# Patient Record
Sex: MALE | Race: ASIAN | Employment: FULL TIME | URBAN - METROPOLITAN AREA
[De-identification: names, ages, dates, MRNs, and addresses within clinical notes are randomized per-mention and may not be internally consistent; named-entity substitution may affect disease eponyms.]

---

## 2022-01-12 ENCOUNTER — LAB ENCOUNTER (OUTPATIENT)
Dept: LAB | Age: 22
End: 2022-01-12
Attending: INTERNAL MEDICINE
Payer: COMMERCIAL

## 2022-01-12 ENCOUNTER — TELEPHONE (OUTPATIENT)
Dept: INTERNAL MEDICINE CLINIC | Facility: CLINIC | Age: 22
End: 2022-01-12

## 2022-01-12 ENCOUNTER — OFFICE VISIT (OUTPATIENT)
Dept: INTERNAL MEDICINE CLINIC | Facility: CLINIC | Age: 22
End: 2022-01-12
Payer: COMMERCIAL

## 2022-01-12 VITALS
RESPIRATION RATE: 14 BRPM | SYSTOLIC BLOOD PRESSURE: 124 MMHG | OXYGEN SATURATION: 100 % | BODY MASS INDEX: 36.12 KG/M2 | TEMPERATURE: 98 F | HEIGHT: 69.29 IN | WEIGHT: 246.63 LBS | DIASTOLIC BLOOD PRESSURE: 80 MMHG | HEART RATE: 81 BPM

## 2022-01-12 DIAGNOSIS — Z00.00 PHYSICAL EXAM, ANNUAL: ICD-10-CM

## 2022-01-12 DIAGNOSIS — Z00.00 PHYSICAL EXAM, ANNUAL: Primary | ICD-10-CM

## 2022-01-12 DIAGNOSIS — Z23 NEED FOR VACCINATION: ICD-10-CM

## 2022-01-12 LAB
ALBUMIN SERPL-MCNC: 4.2 G/DL (ref 3.4–5)
ALBUMIN/GLOB SERPL: 1.1 {RATIO} (ref 1–2)
ALP LIVER SERPL-CCNC: 65 U/L
ALT SERPL-CCNC: 66 U/L
ANION GAP SERPL CALC-SCNC: 5 MMOL/L (ref 0–18)
AST SERPL-CCNC: 26 U/L (ref 15–37)
BASOPHILS # BLD AUTO: 0.02 X10(3) UL (ref 0–0.2)
BASOPHILS NFR BLD AUTO: 0.4 %
BILIRUB SERPL-MCNC: 0.5 MG/DL (ref 0.1–2)
BUN BLD-MCNC: 12 MG/DL (ref 7–18)
CALCIUM BLD-MCNC: 8.8 MG/DL (ref 8.5–10.1)
CHLORIDE SERPL-SCNC: 108 MMOL/L (ref 98–112)
CHOLEST SERPL-MCNC: 126 MG/DL (ref ?–200)
CO2 SERPL-SCNC: 27 MMOL/L (ref 21–32)
CREAT BLD-MCNC: 0.82 MG/DL
EOSINOPHIL # BLD AUTO: 0.11 X10(3) UL (ref 0–0.7)
EOSINOPHIL NFR BLD AUTO: 2.3 %
ERYTHROCYTE [DISTWIDTH] IN BLOOD BY AUTOMATED COUNT: 13.2 %
EST. AVERAGE GLUCOSE BLD GHB EST-MCNC: 120 MG/DL (ref 68–126)
FASTING PATIENT LIPID ANSWER: YES
FASTING STATUS PATIENT QL REPORTED: YES
GLOBULIN PLAS-MCNC: 3.7 G/DL (ref 2.8–4.4)
GLUCOSE BLD-MCNC: 93 MG/DL (ref 70–99)
HBA1C MFR BLD: 5.8 % (ref ?–5.7)
HCT VFR BLD AUTO: 49.7 %
HDLC SERPL-MCNC: 42 MG/DL (ref 40–59)
HGB BLD-MCNC: 16.3 G/DL
IMM GRANULOCYTES # BLD AUTO: 0.01 X10(3) UL (ref 0–1)
IMM GRANULOCYTES NFR BLD: 0.2 %
LDLC SERPL CALC-MCNC: 75 MG/DL (ref ?–100)
LYMPHOCYTES # BLD AUTO: 2.07 X10(3) UL (ref 1–4)
LYMPHOCYTES NFR BLD AUTO: 44.1 %
MCH RBC QN AUTO: 29.1 PG (ref 26–34)
MCHC RBC AUTO-ENTMCNC: 32.8 G/DL (ref 31–37)
MCV RBC AUTO: 88.8 FL
MONOCYTES # BLD AUTO: 0.49 X10(3) UL (ref 0.1–1)
MONOCYTES NFR BLD AUTO: 10.4 %
NEUTROPHILS # BLD AUTO: 1.99 X10 (3) UL (ref 1.5–7.7)
NEUTROPHILS # BLD AUTO: 1.99 X10(3) UL (ref 1.5–7.7)
NEUTROPHILS NFR BLD AUTO: 42.6 %
NONHDLC SERPL-MCNC: 84 MG/DL (ref ?–130)
OSMOLALITY SERPL CALC.SUM OF ELEC: 289 MOSM/KG (ref 275–295)
PLATELET # BLD AUTO: 249 10(3)UL (ref 150–450)
POTASSIUM SERPL-SCNC: 4.7 MMOL/L (ref 3.5–5.1)
PROT SERPL-MCNC: 7.9 G/DL (ref 6.4–8.2)
RBC # BLD AUTO: 5.6 X10(6)UL
SODIUM SERPL-SCNC: 140 MMOL/L (ref 136–145)
TRIGL SERPL-MCNC: 37 MG/DL (ref 30–149)
TSI SER-ACNC: 1.66 MIU/ML (ref 0.36–3.74)
VLDLC SERPL CALC-MCNC: 6 MG/DL (ref 0–30)
WBC # BLD AUTO: 4.7 X10(3) UL (ref 4–11)

## 2022-01-12 PROCEDURE — 90472 IMMUNIZATION ADMIN EACH ADD: CPT | Performed by: INTERNAL MEDICINE

## 2022-01-12 PROCEDURE — 80061 LIPID PANEL: CPT

## 2022-01-12 PROCEDURE — 83036 HEMOGLOBIN GLYCOSYLATED A1C: CPT

## 2022-01-12 PROCEDURE — 90686 IIV4 VACC NO PRSV 0.5 ML IM: CPT | Performed by: INTERNAL MEDICINE

## 2022-01-12 PROCEDURE — 36415 COLL VENOUS BLD VENIPUNCTURE: CPT

## 2022-01-12 PROCEDURE — 85025 COMPLETE CBC W/AUTO DIFF WBC: CPT

## 2022-01-12 PROCEDURE — 90715 TDAP VACCINE 7 YRS/> IM: CPT | Performed by: INTERNAL MEDICINE

## 2022-01-12 PROCEDURE — 84443 ASSAY THYROID STIM HORMONE: CPT

## 2022-01-12 PROCEDURE — 3079F DIAST BP 80-89 MM HG: CPT | Performed by: INTERNAL MEDICINE

## 2022-01-12 PROCEDURE — 99385 PREV VISIT NEW AGE 18-39: CPT | Performed by: INTERNAL MEDICINE

## 2022-01-12 PROCEDURE — 80053 COMPREHEN METABOLIC PANEL: CPT

## 2022-01-12 PROCEDURE — 3074F SYST BP LT 130 MM HG: CPT | Performed by: INTERNAL MEDICINE

## 2022-01-12 PROCEDURE — 90471 IMMUNIZATION ADMIN: CPT | Performed by: INTERNAL MEDICINE

## 2022-01-12 PROCEDURE — 3008F BODY MASS INDEX DOCD: CPT | Performed by: INTERNAL MEDICINE

## 2022-01-12 NOTE — PROGRESS NOTES
[facility]    CHIEF COMPLAINT: Patient presents with:  Establish Care: New Patient  Physical         HPI:   Wanda Ryan is a 24year old male who presents for a complete physical exam.      No current complaints. Exercises regularly. history  ALL/ASTHMA: denies hx of allergy or asthma  EXAM:   /80 (BP Location: Left arm, Patient Position: Sitting, Cuff Size: large)   Pulse 81   Temp 98 °F (36.7 °C) (Temporal)   Resp 14   Ht 5' 9.29\" (1.76 m)   Wt 246 lb 9.6 oz (111.9 kg)   SpO2

## 2022-01-12 NOTE — TELEPHONE ENCOUNTER
Hep B immu updated in the form  Copy made for chart  Immunization record printed and attached with form  Left detailed VM, Pt notified form is ready at

## 2022-01-12 NOTE — TELEPHONE ENCOUNTER
Spoke to pt  Pt does not need TB testing done  Form completed as able  To Dr. Devante Subramanian for review and signature

## 2022-01-12 NOTE — TELEPHONE ENCOUNTER
Pt dropped off a PE form that needs to be completed for Pts work. Placed in triage in basket   Please complete and let Pt know when he can pick it up.    Thank you

## 2022-01-13 ENCOUNTER — MED REC SCAN ONLY (OUTPATIENT)
Dept: INTERNAL MEDICINE CLINIC | Facility: CLINIC | Age: 22
End: 2022-01-13

## 2022-01-14 ENCOUNTER — MED REC SCAN ONLY (OUTPATIENT)
Dept: INTERNAL MEDICINE CLINIC | Facility: CLINIC | Age: 22
End: 2022-01-14

## 2022-01-17 DIAGNOSIS — R74.01 ELEVATED ALT MEASUREMENT: Primary | ICD-10-CM

## 2022-07-07 ENCOUNTER — TELEPHONE (OUTPATIENT)
Dept: INTERNAL MEDICINE CLINIC | Facility: CLINIC | Age: 22
End: 2022-07-07

## 2023-01-18 ENCOUNTER — APPOINTMENT (OUTPATIENT)
Dept: GENERAL RADIOLOGY | Age: 23
End: 2023-01-18
Attending: NURSE PRACTITIONER
Payer: COMMERCIAL

## 2023-01-18 ENCOUNTER — HOSPITAL ENCOUNTER (OUTPATIENT)
Age: 23
Discharge: HOME | End: 2023-01-18
Payer: COMMERCIAL

## 2023-01-18 VITALS
WEIGHT: 260 LBS | TEMPERATURE: 98 F | OXYGEN SATURATION: 97 % | SYSTOLIC BLOOD PRESSURE: 165 MMHG | BODY MASS INDEX: 38 KG/M2 | DIASTOLIC BLOOD PRESSURE: 87 MMHG | RESPIRATION RATE: 16 BRPM | HEART RATE: 92 BPM

## 2023-01-18 DIAGNOSIS — R22.41 LOCALIZED SWELLING OF RIGHT FOOT: Primary | ICD-10-CM

## 2023-01-18 PROCEDURE — 99203 OFFICE O/P NEW LOW 30 MIN: CPT

## 2023-01-18 PROCEDURE — 99213 OFFICE O/P EST LOW 20 MIN: CPT

## 2023-01-18 PROCEDURE — 73630 X-RAY EXAM OF FOOT: CPT | Performed by: NURSE PRACTITIONER

## 2023-01-18 NOTE — ED INITIAL ASSESSMENT (HPI)
Patient reports he went on a long walk about 1 month ago, and later noticed a bump underneath. Patient reports it is not painful.

## 2024-06-15 ENCOUNTER — HOSPITAL ENCOUNTER (OUTPATIENT)
Age: 24
Discharge: HOME | End: 2024-06-15

## 2024-06-15 VITALS
TEMPERATURE: 99 F | SYSTOLIC BLOOD PRESSURE: 143 MMHG | BODY MASS INDEX: 38.2 KG/M2 | RESPIRATION RATE: 18 BRPM | HEART RATE: 109 BPM | DIASTOLIC BLOOD PRESSURE: 91 MMHG | WEIGHT: 257.94 LBS | OXYGEN SATURATION: 100 % | HEIGHT: 69 IN

## 2024-06-15 DIAGNOSIS — B34.9 VIRAL SYNDROME: Primary | ICD-10-CM

## 2024-06-15 PROCEDURE — 99213 OFFICE O/P EST LOW 20 MIN: CPT

## 2024-06-15 RX ORDER — ONDANSETRON 4 MG/1
4 TABLET, ORALLY DISINTEGRATING ORAL EVERY 4 HOURS PRN
Qty: 10 TABLET | Refills: 0 | Status: SHIPPED | OUTPATIENT
Start: 2024-06-15 | End: 2024-06-22

## 2024-06-15 NOTE — ED PROVIDER NOTES
Patient Seen in: [facility]      History     Chief Complaint   Patient presents with    Nausea    Fever     Stated Complaint: fever, nasuea    Subjective:   HPI  23-year-old male presents to the immediate care with a fever bodyaches and chills since Tuesday.  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  States he has slight decrease in the past 2 days and is slightly nauseous after he ate today.  Patient has no other issues complaints or concerns.  The patient's medication list, past medical history and social history elements as listed in today's nurse's notes were reviewed and agreed (except as otherwise stated in the HPI).  The patient's family history reviewed and determined to be noncontributory to the presenting problem.      Objective:   History reviewed. No pertinent past medical history.           History reviewed. No pertinent surgical history.             Social History     Socioeconomic History    Marital status: Single   Occupational History    Occupation: [employer]   Tobacco Use    Smoking status: Former     Types: Cigarettes    Smokeless tobacco: Never    Tobacco comments:     occasional use for about a year.    Vaping Use    Vaping status: Never Used   Substance and Sexual Activity    Alcohol use: Yes     Comment: rarely    Drug use: Never    Sexual activity: Yes     Partners: Male   Other Topics Concern    Caffeine Concern No    Exercise Yes     Comment: running, home workouts    Seat Belt Yes    Special Diet Yes     Comment: Veggie    Stress Concern No    Weight Concern No              Review of Systems    Positive for stated complaint: fever, nasuea  Other systems are as noted in HPI.  Constitutional and vital signs reviewed.      All other systems reviewed and negative except as noted above.    Physical Exam     ED Triage Vitals [06/15/24 1001]   BP (!) 143/91   Pulse 109   Resp 18   Temp 98.6 °F (37 °C)   Temp src Oral   SpO2 100 %    O2 Device None (Room air)       Current Vitals:   Vital Signs  BP: (!) 143/91  Pulse: 109  Resp: 18  Temp: 98.6 °F (37 °C)  Temp src: Oral    Oxygen Therapy  SpO2: 100 %  O2 Device: None (Room air)            Physical Exam    GENERAL: The patient is well-developed well-nourished nontoxic, non-ill-appearing  HEENT: Normocephalic.  Atraumatic.  Extraocular motions are intact  NECK: Supple.  No meningitic signs are noted.   CHEST/LUNGS: Clear to auscultation.  There is no respiratory distress noted.  HEART/CARDIOVASCULAR: Regular.  There is no tachycardia.   SKIN: There is no rash.  NEURO: The patient is awake, alert, and oriented.  The patient is cooperative.    ED Course   Labs Reviewed - No data to display                   MDM   Patient present with signs and symptoms consistent with upper respiratory infection and consider possible life-threatening etiologies are presenting complaint including severe bacterial infection, meningitis, acute cardiopulmonary process etc.  And doubt given; history, exam,  Suspect likely viral etiology of upper respiratory infection.  Patient afebrile with normal vital signs and patient nontoxic appearing, well-hydrated in no apparent distress and no respiratory distress, this will discharge to follow-up with primary care physician in 2-3 days.  Supportive care instructions provided.  Patient to take over-the-counter and Tylenol and Motrin as needed for fever and pain.  Advised to return to the emergency room if any new or worsening symptoms including but not limited to; change in mental status.  Meningeal signs, abdominal pain, nausea vomiting, chest pain/shortness of breath, new rash, decreased urinary output or any other concerns.                                     Medical Decision Making      Disposition and Plan     Clinical Impression:  1. Viral syndrome         Disposition:  Discharge  6/15/2024 10:47 am    Follow-up:  Raiza Browne MD  [address] [facility]  [address] [place] [address]  [place] [address]  [phone]                Medications Prescribed:  Discharge Medication List as of 6/15/2024 10:48 AM        START taking these medications    Details   ondansetron 4 MG Oral Tablet Dispersible Take 1 tablet (4 mg total) by mouth every 4 (four) hours as needed for Nausea., Normal, Disp-10 tablet, R-0

## 2024-06-15 NOTE — ED INITIAL ASSESSMENT (HPI)
Fever- started Tuesday  temp 100 intermittent. Pt states he still feels sick . Took nyquil     Nausea- started tuesday

## 2024-06-15 NOTE — DISCHARGE INSTRUCTIONS
Follow-up with your primary care physician in one week if symptoms have not improved or symptoms are starting to get worse.  Increase fluids, keep well-hydrated.  Take Tylenol and Motrin for fever and pain.  Use the Zofran for nausea  Return to the emergency room for symptoms or concerns

## 2024-06-16 ENCOUNTER — APPOINTMENT (OUTPATIENT)
Dept: ULTRASOUND IMAGING | Facility: HOSPITAL | Age: 24
DRG: 442 | End: 2024-06-16
Attending: STUDENT IN AN ORGANIZED HEALTH CARE EDUCATION/TRAINING PROGRAM

## 2024-06-16 ENCOUNTER — HOSPITAL ENCOUNTER (INPATIENT)
Facility: HOSPITAL | Age: 24
LOS: 2 days | Discharge: HOME | DRG: 442 | End: 2024-06-18
Attending: STUDENT IN AN ORGANIZED HEALTH CARE EDUCATION/TRAINING PROGRAM | Admitting: INTERNAL MEDICINE

## 2024-06-16 DIAGNOSIS — B17.9 ACUTE HEPATITIS: ICD-10-CM

## 2024-06-16 DIAGNOSIS — R17 JAUNDICE: Primary | ICD-10-CM

## 2024-06-16 PROBLEM — R73.9 HYPERGLYCEMIA: Status: ACTIVE | Noted: 2024-06-16

## 2024-06-16 PROBLEM — R11.2 NAUSEA AND VOMITING: Status: ACTIVE | Noted: 2024-06-16

## 2024-06-16 LAB
ADENOVIRUS F 40/41 PCR: NEGATIVE
ALBUMIN SERPL-MCNC: 3.9 G/DL (ref 3.4–5)
ALBUMIN/GLOB SERPL: 0.8 {RATIO} (ref 1–2)
ALP LIVER SERPL-CCNC: 333 U/L
ALT SERPL-CCNC: 465 U/L
ANION GAP SERPL CALC-SCNC: 8 MMOL/L (ref 0–18)
APAP SERPL-MCNC: <2 UG/ML (ref 10–30)
APTT PPP: 25.9 SECONDS (ref 23–36)
AST SERPL-CCNC: 252 U/L (ref 15–37)
ASTROVIRUS PCR: NEGATIVE
BASOPHILS # BLD AUTO: 0.04 X10(3) UL (ref 0–0.2)
BASOPHILS NFR BLD AUTO: 0.8 %
BILIRUB SERPL-MCNC: 5.3 MG/DL (ref 0.1–2)
BUN BLD-MCNC: 8 MG/DL (ref 9–23)
C CAYETANENSIS DNA SPEC QL NAA+PROBE: NEGATIVE
CALCIUM BLD-MCNC: 8.8 MG/DL (ref 8.5–10.1)
CAMPY SP DNA.DIARRHEA STL QL NAA+PROBE: NEGATIVE
CHLORIDE SERPL-SCNC: 101 MMOL/L (ref 98–112)
CO2 SERPL-SCNC: 26 MMOL/L (ref 21–32)
CREAT BLD-MCNC: 0.97 MG/DL
CRYPTOSP DNA SPEC QL NAA+PROBE: NEGATIVE
DEPRECATED HBV CORE AB SER IA-ACNC: 3646.3 NG/ML
EAEC PAA PLAS AGGR+AATA ST NAA+NON-PRB: NEGATIVE
EC STX1+STX2 + H7 FLIC SPEC NAA+PROBE: NEGATIVE
EGFRCR SERPLBLD CKD-EPI 2021: 112 ML/MIN/1.73M2 (ref 60–?)
ENTAMOEBA HISTOLYTICA PCR: NEGATIVE
EOSINOPHIL # BLD AUTO: 0.13 X10(3) UL (ref 0–0.7)
EOSINOPHIL NFR BLD AUTO: 2.7 %
EPEC EAE GENE STL QL NAA+NON-PROBE: NEGATIVE
ERYTHROCYTE [DISTWIDTH] IN BLOOD BY AUTOMATED COUNT: 13.8 %
EST. AVERAGE GLUCOSE BLD GHB EST-MCNC: 131 MG/DL (ref 68–126)
ETEC LTA+ST1A+ST1B TOX ST NAA+NON-PROBE: NEGATIVE
FLUAV + FLUBV RNA SPEC NAA+PROBE: NEGATIVE
FLUAV + FLUBV RNA SPEC NAA+PROBE: NEGATIVE
GIARDIA LAMBLIA PCR: NEGATIVE
GLOBULIN PLAS-MCNC: 4.6 G/DL (ref 2.8–4.4)
GLUCOSE BLD-MCNC: 111 MG/DL (ref 70–99)
HAV IGM SER QL: NONREACTIVE
HBA1C MFR BLD: 6.2 % (ref ?–5.7)
HBV CORE AB SERPL QL IA: NONREACTIVE
HBV CORE IGM SER QL: NONREACTIVE
HBV SURFACE AG SERPL QL IA: NONREACTIVE
HCT VFR BLD AUTO: 51.6 %
HCV AB SERPL QL IA: NONREACTIVE
HETEROPH AB SER QL: NEGATIVE
HGB BLD-MCNC: 17.4 G/DL
IMM GRANULOCYTES # BLD AUTO: 0.01 X10(3) UL (ref 0–1)
IMM GRANULOCYTES NFR BLD: 0.2 %
INR BLD: 0.94 (ref 0.8–1.2)
IRON SATN MFR SERPL: 30 %
IRON SERPL-MCNC: 113 UG/DL
LIPASE SERPL-CCNC: 47 U/L (ref 13–75)
LYMPHOCYTES # BLD AUTO: 2.41 X10(3) UL (ref 1–4)
LYMPHOCYTES NFR BLD AUTO: 49.5 %
MCH RBC QN AUTO: 28 PG (ref 26–34)
MCHC RBC AUTO-ENTMCNC: 33.7 G/DL (ref 31–37)
MCV RBC AUTO: 83 FL
MONOCYTES # BLD AUTO: 0.34 X10(3) UL (ref 0.1–1)
MONOCYTES NFR BLD AUTO: 7 %
NEUTROPHILS # BLD AUTO: 1.94 X10 (3) UL (ref 1.5–7.7)
NEUTROPHILS # BLD AUTO: 1.94 X10(3) UL (ref 1.5–7.7)
NEUTROPHILS NFR BLD AUTO: 39.8 %
NOROVIRUS GI/GII PCR: POSITIVE
OSMOLALITY SERPL CALC.SUM OF ELEC: 279 MOSM/KG (ref 275–295)
P SHIGELLOIDES DNA STL QL NAA+PROBE: NEGATIVE
PLATELET # BLD AUTO: 175 10(3)UL (ref 150–450)
POTASSIUM SERPL-SCNC: 3.7 MMOL/L (ref 3.5–5.1)
PROT SERPL-MCNC: 8.5 G/DL (ref 6.4–8.2)
PROTHROMBIN TIME: 12.6 SECONDS (ref 11.6–14.8)
RBC # BLD AUTO: 6.22 X10(6)UL
ROTAVIRUS A PCR: NEGATIVE
RSV RNA SPEC NAA+PROBE: NEGATIVE
SALMONELLA DNA SPEC QL NAA+PROBE: NEGATIVE
SAPOVIRUS PCR: NEGATIVE
SARS-COV-2 RNA RESP QL NAA+PROBE: NOT DETECTED
SHIGELLA SP+EIEC IPAH ST NAA+NON-PROBE: NEGATIVE
SODIUM SERPL-SCNC: 135 MMOL/L (ref 136–145)
TIBC SERPL-MCNC: 380 UG/DL (ref 240–450)
TRANSFERRIN SERPL-MCNC: 255 MG/DL (ref 200–360)
V CHOLERAE DNA SPEC QL NAA+PROBE: NEGATIVE
VIBRIO DNA SPEC NAA+PROBE: NEGATIVE
WBC # BLD AUTO: 4.9 X10(3) UL (ref 4–11)
YERSINIA DNA SPEC NAA+PROBE: NEGATIVE

## 2024-06-16 PROCEDURE — 76700 US EXAM ABDOM COMPLETE: CPT | Performed by: STUDENT IN AN ORGANIZED HEALTH CARE EDUCATION/TRAINING PROGRAM

## 2024-06-16 PROCEDURE — 99223 1ST HOSP IP/OBS HIGH 75: CPT | Performed by: INTERNAL MEDICINE

## 2024-06-16 RX ORDER — MELATONIN
3 NIGHTLY PRN
Status: DISCONTINUED | OUTPATIENT
Start: 2024-06-16 | End: 2024-06-18

## 2024-06-16 RX ORDER — ENEMA 19; 7 G/133ML; G/133ML
1 ENEMA RECTAL ONCE AS NEEDED
Status: DISCONTINUED | OUTPATIENT
Start: 2024-06-16 | End: 2024-06-18

## 2024-06-16 RX ORDER — MULTIVITAMIN
1 TABLET ORAL DAILY
COMMUNITY

## 2024-06-16 RX ORDER — PROCHLORPERAZINE EDISYLATE 5 MG/ML
5 INJECTION INTRAMUSCULAR; INTRAVENOUS EVERY 8 HOURS PRN
Status: DISCONTINUED | OUTPATIENT
Start: 2024-06-16 | End: 2024-06-18

## 2024-06-16 RX ORDER — MORPHINE SULFATE 4 MG/ML
4 INJECTION, SOLUTION INTRAMUSCULAR; INTRAVENOUS EVERY 30 MIN PRN
Status: ACTIVE | OUTPATIENT
Start: 2024-06-16 | End: 2024-06-16

## 2024-06-16 RX ORDER — ENOXAPARIN SODIUM 100 MG/ML
40 INJECTION SUBCUTANEOUS DAILY
Status: DISCONTINUED | OUTPATIENT
Start: 2024-06-16 | End: 2024-06-18

## 2024-06-16 RX ORDER — ACETAMINOPHEN 500 MG
500 TABLET ORAL EVERY 4 HOURS PRN
Status: DISCONTINUED | OUTPATIENT
Start: 2024-06-16 | End: 2024-06-18

## 2024-06-16 RX ORDER — SODIUM CHLORIDE, SODIUM LACTATE, POTASSIUM CHLORIDE, CALCIUM CHLORIDE 600; 310; 30; 20 MG/100ML; MG/100ML; MG/100ML; MG/100ML
INJECTION, SOLUTION INTRAVENOUS CONTINUOUS
Status: DISCONTINUED | OUTPATIENT
Start: 2024-06-16 | End: 2024-06-18

## 2024-06-16 RX ORDER — POLYETHYLENE GLYCOL 3350 17 G/17G
17 POWDER, FOR SOLUTION ORAL DAILY PRN
Status: DISCONTINUED | OUTPATIENT
Start: 2024-06-16 | End: 2024-06-18

## 2024-06-16 RX ORDER — SENNOSIDES 8.6 MG
17.2 TABLET ORAL NIGHTLY PRN
Status: DISCONTINUED | OUTPATIENT
Start: 2024-06-16 | End: 2024-06-18

## 2024-06-16 RX ORDER — SODIUM CHLORIDE 9 MG/ML
INJECTION, SOLUTION INTRAVENOUS CONTINUOUS
Status: ACTIVE | OUTPATIENT
Start: 2024-06-16 | End: 2024-06-16

## 2024-06-16 RX ORDER — ONDANSETRON 2 MG/ML
4 INJECTION INTRAMUSCULAR; INTRAVENOUS EVERY 4 HOURS PRN
Status: DISCONTINUED | OUTPATIENT
Start: 2024-06-16 | End: 2024-06-16

## 2024-06-16 RX ORDER — BISACODYL 10 MG
10 SUPPOSITORY, RECTAL RECTAL
Status: DISCONTINUED | OUTPATIENT
Start: 2024-06-16 | End: 2024-06-18

## 2024-06-16 RX ORDER — ONDANSETRON 2 MG/ML
4 INJECTION INTRAMUSCULAR; INTRAVENOUS EVERY 6 HOURS PRN
Status: DISCONTINUED | OUTPATIENT
Start: 2024-06-16 | End: 2024-06-18

## 2024-06-16 NOTE — PROGRESS NOTES
NURSING ADMISSION NOTE      Patient admitted via Ambulatory  Oriented to room.  Safety precautions initiated.  Bed in low position.  Call light in reach.    Assumed care at 1400  A/O x 4  No tele  SBP slightly elevated, just monitoring for now per   Denies pain / nausea. No vomiting. Reports appetite returned today.   LR @ 100 ml/hr in R AC PIV  Continent  Slight jaundice noted  Up ad shayla. Ambulating in halls.   Regular diet  Non-cardiac EP  Reports BM today prior to arrival to ED, no diarrhea since Thursday. Awaiting stool sample. Reports dark colored urine.  GI on consult  All needs met. All questions answered. Will continue with plan of care.    1715: stool sample sent.

## 2024-06-16 NOTE — H&P
[facility]     Mauricio Joyner Patient Status:  Emergency    2000 MRN [id number]   [facility] EMERGENCY DEPARTMENT Attending Luana Carmona MD   Hosp Day # 0 PCP Raiza Browne MD     Chief Complaint: n/v/d, jaundice    Subjective:    History of Present Illness:     Mauricio Joyner is a 23 year old male with no significant pmhx who presents with complaint of several days of nausea, NBNB emesis, and multiple episodes of loose stools. He reports he developed malaise, fatigue, HA's and felt feverish (did not check his temperatures) 5 days ago. He had decreased appetite and poor PO intake the following days as well as continued to feel feverish/HA's. Had episode of loose/watery stools and nausea a few days ago which has since resolved. This morning, he felt he looked jaundiced when he looked in the mirror and he noted that his urine was much darker than normal and his stools were hank colored. He came to ED for further evaluation. His sister has history of autoimmune hepatitis which was diagnosed after she began using herbal supplements. He has another sister who has history of lupus. He started taking a supplement called \"bloom\" last week, but did not use it this week. No other herbal supplements. No recent sick contacts that he is aware of, or consumption of raw/undercooked meat. He rarely consumes EtOH.     History/Other:    Past Medical History:  History reviewed. No pertinent past medical history.  Past Surgical History:   History reviewed. No pertinent surgical history.   Family History:   Family History   Problem Relation Age of Onset    Diabetes Mother     High Blood Pressure Mother     High Blood Pressure Father     Heart Attack Father     Other (lupus) Sister      Social History:    reports that he has quit smoking. His smoking use included cigarettes. He has never used smokeless tobacco. He reports current alcohol use. He reports that he does not use  drugs.     Allergies:   Allergies   Allergen Reactions    Dander HIVES, ITCHING and OTHER (SEE COMMENTS)     Red Eyes      Dust OTHER (SEE COMMENTS)     Nasal Issues - Stuffy Nose, Sneezing         Medications:    No current facility-administered medications on file prior to encounter.     Current Outpatient Medications on File Prior to Encounter   Medication Sig Dispense Refill    ondansetron 4 MG Oral Tablet Dispersible Take 1 tablet (4 mg total) by mouth every 4 (four) hours as needed for Nausea. 10 tablet 0    Cyanocobalamin (VITAMIN B-12 OR) Take 1 tablet by mouth daily.      Ascorbic Acid (VITAMIN C OR) Take 1 tablet by mouth daily.         Review of Systems:   A comprehensive review of systems was completed.    Pertinent positives and negatives noted in the HPI.    Objective:   Physical Exam:    /69   Pulse 75   Temp 97.6 °F (36.4 °C)   Resp 19   Ht 5' 9\" (1.753 m)   Wt 250 lb (113.4 kg)   SpO2 100%   BMI 36.92 kg/m²   General: No acute distress, Alert, scleral icterus   Respiratory: No rhonchi, no wheezes  Cardiovascular: S1, S2. Regular rate and rhythm  Abdomen: Soft, Non-tender, non-distended, positive bowel sounds  Neuro: No new focal deficits  Extremities: No edema    Results:    Labs:      Labs Last 24 Hours:    Recent Labs   Lab 06/16/24  0939   RBC 6.22*   HGB 17.4   HCT 51.6   MCV 83.0   MCH 28.0   MCHC 33.7   RDW 13.8   NEPRELIM 1.94   WBC 4.9   .0       Recent Labs   Lab 06/16/24  0939   *   BUN 8*   CREATSERUM 0.97   EGFRCR 112   CA 8.8   ALB 3.9   *   K 3.7      CO2 26.0   ALKPHO 333*   *   *   BILT 5.3*   TP 8.5*       Lab Results   Component Value Date    INR 0.94 06/16/2024       No results for input(s): \"TROP\", \"TROPHS\", \"CK\" in the last 168 hours.    No results for input(s): \"TROP\", \"PBNP\" in the last 168 hours.    No results for input(s): \"PCT\" in the last 168 hours.    Imaging: Imaging data reviewed in Epic.    Assessment & Plan:       #Acute hepatitis, suspect viral etiology  #N/v/d  - GI stool PCR panel, acute hepatitis panel ordered  - INR pending  - abdominal ultrasound reviewed > diffuse fatty inffiltration  - supportive care, IVF, anti-emetics PRN  - trend LFT's  - GI consulted from ED    #Hyperglycemia  #Prediabetes  - HgbA1c ordered    #Elevated BP without prior diagnosis of HTN  - suspect stress mediated  - monitor BP's during admission        Plan of care discussed with pt, ED    Krystina Bailon,     Supplementary Documentation:     The 21st Century Cures Act makes medical notes like these available to patients in the interest of transparency. Please be advised this is a medical document. Medical documents are intended to carry relevant information, facts as evident, and the clinical opinion of the practitioner. The medical note is intended as peer to peer communication and may appear blunt or direct. It is written in medical language and may contain abbreviations or verbiage that are unfamiliar.

## 2024-06-16 NOTE — PLAN OF CARE
Problem: GASTROINTESTINAL - ADULT  Goal: Minimal or absence of nausea and vomiting  Description: INTERVENTIONS:  - Maintain adequate hydration with IV or PO as ordered and tolerated  - Nasogastric tube to low intermittent suction as ordered  - Evaluate effectiveness of ordered antiemetic medications  - Provide nonpharmacologic comfort measures as appropriate  - Advance diet as tolerated, if ordered  - Obtain nutritional consult as needed  - Evaluate fluid balance  Outcome: Progressing  Goal: Maintains or returns to baseline bowel function  Description: INTERVENTIONS:  - Assess bowel function  - Maintain adequate hydration with IV or PO as ordered and tolerated  - Evaluate effectiveness of GI medications  - Encourage mobilization and activity  - Obtain nutritional consult as needed  - Establish a toileting routine/schedule  - Consider collaborating with pharmacy to review patient's medication profile  Outcome: Progressing

## 2024-06-16 NOTE — ED PROVIDER NOTES
Patient Seen in: [facility] Emergency Department      History     Chief Complaint   Patient presents with    Jaundice     Stated Complaint: States that he has been sick for a while, and has been on a lot of medication. *    Subjective:   HPI    Patient is a 23-year-old previously healthy male presenting to emergency department for evaluation of a concern that his eyes seem yellow this morning.  Patient had been sick for about a week with headache, felt feverish, mild cough, diarrhea, difficulty sleeping and decreased appetite.  He recalls not eating for a few days as he was sleeping a lot of the time of the day.  He did seem to have a hard time falling back asleep when he would wake up at night.  He had been taking NyQuil and was prescribed Zofran for nausea by immediate care.  This morning he woke up feeling much better when he was in the restroom he became concerned about the discoloration of the white parts of his eyes.  He recalls that her sister who had jaundice secondary to hepatitis years ago had experienced yellowing of her eyes and decided to come to the ER for evaluation.  He denies any pain at this time, no nausea or vomiting currently, no fever, no other complaints.    Objective:   History reviewed. No pertinent past medical history.           History reviewed. No pertinent surgical history.             Social History     Socioeconomic History    Marital status: Single   Occupational History    Occupation: [employer]    Tobacco Use    Smoking status: Former     Types: Cigarettes    Smokeless tobacco: Never    Tobacco comments:     occasional use for about a year.    Vaping Use    Vaping status: Never Used   Substance and Sexual Activity    Alcohol use: Yes     Comment: rarely    Drug use: Never    Sexual activity: Yes     Partners: Male   Other Topics Concern    Caffeine Concern No    Exercise Yes     Comment: running, home workouts    Seat Belt Yes    Special Diet Yes      Comment: Veggie    Stress Concern No    Weight Concern No              Review of Systems    Positive for stated complaint: States that he has been sick for a while, and has been on a lot of medication. *  Other systems are as noted in HPI.  Constitutional and vital signs reviewed.      All other systems reviewed and negative except as noted above.    Physical Exam     ED Triage Vitals   BP 06/16/24 0934 (!) 172/101   Pulse 06/16/24 0934 93   Resp 06/16/24 0934 16   Temp 06/16/24 0934 97.6 °F (36.4 °C)   Temp src --    SpO2 06/16/24 0945 98 %   O2 Device --        Current:/69   Pulse 75   Temp 97.6 °F (36.4 °C)   Resp 19   Ht 175.3 cm (5' 9\")   Wt 113.4 kg   SpO2 100%   BMI 36.92 kg/m²         Physical Exam    Constitutional: No apparent distress  Eyes: Mild scleral icterus, no conjunctival injection, no eye drainage, pupils equal and reactive to light bilaterally  Heart: regular rate rhythm, no murmurs  Lungs: Clear to auscultation bilaterally  Abdomen: Soft, nontender, normal active bowel sounds  Skin: No rash  Neuro: Alert and oriented ×3          ED Course/ My interpretations:     Labs Reviewed   COMP METABOLIC PANEL (14) - Abnormal; Notable for the following components:       Result Value    Glucose 111 (*)     Sodium 135 (*)     BUN 8 (*)      (*)      (*)     Alkaline Phosphatase 333 (*)     Bilirubin, Total 5.3 (*)     Total Protein 8.5 (*)     Globulin  4.6 (*)     A/G Ratio 0.8 (*)     All other components within normal limits   CBC W/ DIFFERENTIAL - Abnormal; Notable for the following components:    RBC 6.22 (*)     All other components within normal limits   LIPASE - Normal   PROTHROMBIN TIME (PT) - Normal   PTT, ACTIVATED - Normal   SARS-COV-2/FLU A AND B/RSV BY PCR (GENEXPERT) - Normal    Narrative:     This test is intended for the qualitative detection and differentiation of SARS-CoV-2, influenza A, influenza B, and respiratory syncytial virus (RSV) viral RNA in  nasopharyngeal or nares swabs from individuals suspected of respiratory viral infection consistent with COVID-19 by their healthcare provider. Signs and symptoms of respiratory viral infection due to SARS-CoV-2, influenza, and RSV can be similar.    Test performed using the Xpert Xpress SARS-CoV-2/FLU/RSV (real time RT-PCR)  assay on the [employer] instrument, [employer], [place] [address].   This test is being used under the Food and Drug Administration's Emergency Use Authorization.    The authorized Fact Sheet for Healthcare Providers for this assay is available upon request from the laboratory.   CBC WITH DIFFERENTIAL WITH PLATELET    Narrative:     The following orders were created for panel order CBC With Differential With Platelet.  Procedure                               Abnormality         Status                     ---------                               -----------         ------                     CBC W/ DIFFERENTIAL[[id number]]          Abnormal            Final result                 Please view results for these tests on the individual orders.   SCAN SLIDE   HEPATITIS PANEL, ACUTE (4)   MONO QUAL, RFX TO EBV-VCA ON NEG   ACETAMINOPHEN (TYLENOL), S   HEMOGLOBIN A1C   RAINBOW DRAW LAVENDER   RAINBOW DRAW LIGHT GREEN   RAINBOW DRAW BLUE   RAINBOW DRAW GOLD   GI STOOL PANEL BY PCR         Medications given:   sodium chloride 0.9 % IV bolus 1,000 mL             MDM      Extensive differential was considered including acute jaundice, hepatitis, hemolysis, diverticulitis, cholecystitis, appendicitis, colitis, gastroenteritis, bowel obstruction, and other gi, infectious, vascular, malignant, rheumatologic and other pathology.   Symptoms were addressed with iv fluids.    No pain or nausea reported by the patient in the ED.  GI consultation was pursued.  Given severity of patient's symptoms and ED findings decision was made to admit the patient for further treatment and evaluation.    Patient remained  hemodynamically stable throughout their emergency department period of observation with no adverse events or symproms reported by the patient.  The case was discussed in detail with the admitting physician who agreed with the emergency department management and disposition of the patient and evaluated him in the emergency department taking over his care.     Admission disposition: 6/16/2024 12:44 PM               Disposition and Plan     Clinical Impression:  1. Jaundice    2. Acute hepatitis         Disposition:  Admit  6/16/2024 12:44 pm    Follow-up:  No follow-up provider specified.        Medications Prescribed:  Current Discharge Medication List                            Hospital Problems       Present on Admission  Date Reviewed: 1/18/2023            ICD-10-CM Noted POA    Jaundice R17 6/16/2024 Unknown           Documentation created with the aid of Dragon voice recognition software.  Although efforts were made to ensure the accuracy of the note, some inaccuracies may persist.

## 2024-06-16 NOTE — ED INITIAL ASSESSMENT (HPI)
Pt reports experiencing viral illnesses for about a week . Symptoms reported are headache\" feeling feverish\", mild cough, diarrhea, difficulty sleeping and decreased appetite. Today he is feeling better from illness but now states \"I am jaundiced\".

## 2024-06-16 NOTE — CONSULTS
[facility]                       Gastroenterology Consultation-[facility]    Mauricio Joyner Patient Status:  Emergency    2000 MRN [id number]   Location [facility] EMERGENCY DEPARTMENT Attending Luana Carmona MD   Hosp Day # 0 PCP Raiza Browne MD     Reason for consultation: Juandice/Elevated LFTs  HPI: Mr. Joyner is a 22 y/o with no significant past medical history who presents to the ED with concerns of juandice.  He reports viral symptoms that started approx 5 days ago with a headache and fatigue that progressed to him feeling feverish, chills, poor appetite, nausea without vomiting, abdominal pain, and 3 episodes of non-bloody diarrhea on Wednesday. He reports taking Nyquil x 3 days at night only with symptoms resolving shortly thereafter.  He then noticed his skin was turning yellow this morning. He reports consuming 1 can of bloom energy drink on Monday; denies any new medications, herbals, excessive Tylenol use, recent travels or known sick contacts. Initial labs note elevated transaminase levels; Alk Phos 333, , , Tbili 5.3, INR 0.94, no leukocytosis, hepatitis panel, Tylenol level and mono neg, Abdominal US notes diffusely increased echogenicity throughout the liver consistent with fatty infiltration of the liver or hepatitis, no focal mass or lesions, contracted GB with wall thickening measuring up to 6 cm, no definite gallstones, CBD measuring 3 mm with a negative Sahni's sign. He reports a familial hx of acute hepatitis (sister) and family hx of lupus; has no prior hospitalizations, abdominal surgeries or prior endoscopic evaluations.   PMHx: History reviewed. No pertinent past medical history.             PSHx: History reviewed. No pertinent surgical history.  Medications:    [COMPLETED] sodium chloride 0.9 % IV bolus 1,000 mL  1,000 mL Intravenous Once     Allergies:   Allergies   Allergen Reactions    Dander HIVES, ITCHING and OTHER (SEE  COMMENTS)     Red Eyes      Dust OTHER (SEE COMMENTS)     Nasal Issues - Stuffy Nose, Sneezing       Social HX:   Social History     Socioeconomic History    Marital status: Single   Occupational History    Occupation: [employer]    Tobacco Use    Smoking status: Former     Types: Cigarettes    Smokeless tobacco: Never    Tobacco comments:     occasional use for about a year.    Vaping Use    Vaping status: Never Used   Substance and Sexual Activity    Alcohol use: Yes     Comment: rarely    Drug use: Never    Sexual activity: Yes     Partners: Male   Other Topics Concern    Caffeine Concern No    Exercise Yes     Comment: running, home workouts    Seat Belt Yes    Special Diet Yes     Comment: Veggie    Stress Concern No    Weight Concern No      FamHx: The patient has no family history of colon cancer or other gastrointestinal malignancies;  No family history of ulcer disease, or inflammatory bowel disease  ROS:  In addition to the pertinent positives described above:            Infectious Disease:  No chronic infections or recent fevers prior to the acute illness            Cardiovascular: No history of CAD, prior MI, chest pain, or palpitations            Respiratory: No shortness of breath, asthma, copd, recurrent pneumonia            Hematologic: The patient reports no easy bruising, frequent gum bleeding or nose bleeding;  The patient has no history of known chronic anemia            Dermatologic: The patient reports no recent rashes or chronic skin disorders            Rheumatologic: The patient reports no history of chronic arthritis, myalgias, arthralgias            Genitourinary:  The patient reports no history of recurrent urinary tract infections, hematuria, dysuria, or nephrolithiasis           Psychiatric: The patient reports no history of depression, anxiety, suicidal ideation, or homicidal ideation           Oncologic: The patient reports no history of prior solid tumor  or hematologic malignancy           ENT: The patient reports no hoarseness of voice, hearing loss, sinus congestion, tinnitus           Neurologic: The patient reports no history of seizure, stroke, or frequent headaches  PE: /69   Pulse 75   Temp 97.6 °F (36.4 °C)   Resp 19   Ht 5' 9\" (1.753 m)   Wt 250 lb (113.4 kg)   SpO2 100%   BMI 36.92 kg/m²   Gen: AAO x 3, able to speak in complete sentences  HENT: EOMI, PERRL, oropharynx is clear with moist mucosal membranes  Eyes: Sclerae are icteric  Neck:  Supple without nuchal rigidity  CV: Regular rate and rhythm, with normal S1 and S2  Resp: Clear to auscultation bilaterally without wheezes; rubs, rhonchi, or rales  Abdomen: Soft, non-tender, non-distended with the presence of bowel sounds; No hepatosplenomegaly; no rebound or guarding; No ascites is clinically apparent; no tympany to percussion  Ext: No peripheral edema or cyanosis  Skin: Generalized jaundice, warm and dry  Psychiatric: Appropriate mood and congruent affect without obvious depression or anxiety  Labs:   Lab Results   Component Value Date    WBC 4.9 06/16/2024    HGB 17.4 06/16/2024    HCT 51.6 06/16/2024    .0 06/16/2024    CREATSERUM 0.97 06/16/2024    BUN 8 06/16/2024     06/16/2024    K 3.7 06/16/2024     06/16/2024    CO2 26.0 06/16/2024     06/16/2024    CA 8.8 06/16/2024    ALB 3.9 06/16/2024    ALKPHO 333 06/16/2024    BILT 5.3 06/16/2024     06/16/2024     06/16/2024    PTT 25.9 06/16/2024    INR 0.94 06/16/2024    PTP 12.6 06/16/2024    LIP 47 06/16/2024     Recent Labs   Lab 06/16/24  0939   *   BUN 8*   CREATSERUM 0.97   CA 8.8   *   K 3.7      CO2 26.0     Recent Labs   Lab 06/16/24  0939   RBC 6.22*   HGB 17.4   HCT 51.6   MCV 83.0   MCH 28.0   MCHC 33.7   RDW 13.8   NEPRELIM 1.94   WBC 4.9   .0       Recent Labs   Lab 06/16/24  0939   *   *                   Imaging:   Abdominal US:  Impression    CONCLUSION:       1.  Diffusely increased echogenicity of the liver can be seen with fatty infiltration of the liver or hepatitis.     2.  Contracted gallbladder without evidence of gallstones or biliary obstruction.     Impression: Mr. Joyner is a 24 y/o with no significant past medical history who presents to the ED with concerns of juandice, recent viral symptoms that started approx 5 days ago- now resolved.      Elevated Transaminase -  Alk Phos 333, , , Tbili 5.3, normal INR         Normal INR and Tylenol level, negative acute hepatitis panel, mono and viral panel; EBV pending    He is resting comfortably in bed with normal mentation in no acute distress --no asterixis on exam. Will manage conservatively at this time with the trending LFTs; additional lab work ordered to r/o infectious vs autoimmune hepatitis.         Recommendations:     Regular diet  DVT prophylaxis  CBC/CMP in am; trending LFTs  Iron studies, Ferritin  INR in am  CMV, AMA, ASMA, IgG4, A1at, ceruloplasmin, Hep B core, total AB  Pain management/antiemetics per primary service  Conservative management at this time while we await additional serology results    Thank you for the consultation, we will follow the patient with you.  Attending addendum Dr. Tulio Keyes to follow later today and provide formal, final recommendations at that time   SAMANTA Perez  1:12 PM  6/16/2024  [facility]  [phone]     GI attending addendum:    I have personally seen and examined this patient and agree with above nurse practitioner's assessment and recommendations.     Briefly, this is a 23-year-old man presenting with jaundice and elevated LFTs.  Patient began having viral symptoms a few days ago along with poor appetite nausea without vomiting and abdominal pain.  Patient was taking NyQuil for 3 days but no other chronic or severe use.  LFTs found to be elevated with , , , total bilirubin 5.3, INR  0.94.  Patient denies any confusion or somnolence.  No asterixis on exam.  Patient does endorse 1 can of a new energy drink but no other new medications or herbal supplements.  Ultrasound abdomen with contracted and thickened gallbladder and fatty infiltration.  No heavy alcohol use.    Assessment and Plan:  -Patient with acute elevation of LFTs of unknown etiology possibly infectious in nature versus biliary versus autoimmune versus Dili  -Will look to obtain chronic liver disease workup; EBV PCR pending  -Given thickened gallbladder wall, will obtain MR abdomen/MRCP  -Daily LFTs  -Will look to monitor closely for signs of encephalopathy; none currently    Alex Keyes MD, 06/16/24, 6:52 PM  [facility]  [phone]

## 2024-06-16 NOTE — ED QUICK NOTES
Orders for admission, patient is aware of plan and ready to go upstairs. Any questions, please call ED RN Caty at extension [phone].     Patient Covid vaccination status: Fully vaccinated     COVID Test Ordered in ED: SARS-CoV-2/Flu A and B/RSV by PCR (GeneXpert)    COVID Suspicion at Admission: N/A    Running Infusions:  None    Mental Status/LOC at time of transport: A&Ox4    Other pertinent information:   CIWA score: N/A   NIH score:  N/A

## 2024-06-17 ENCOUNTER — APPOINTMENT (OUTPATIENT)
Dept: MRI IMAGING | Facility: HOSPITAL | Age: 24
DRG: 442 | End: 2024-06-17
Attending: INTERNAL MEDICINE

## 2024-06-17 PROBLEM — A08.11 NOROVIRUS: Status: ACTIVE | Noted: 2024-06-17

## 2024-06-17 LAB
A1AT SERPL-MCNC: 181 MG/DL (ref 90–200)
ALBUMIN SERPL-MCNC: 3.4 G/DL (ref 3.4–5)
ALBUMIN/GLOB SERPL: 0.9 {RATIO} (ref 1–2)
ALP LIVER SERPL-CCNC: 285 U/L
ALT SERPL-CCNC: 405 U/L
ANION GAP SERPL CALC-SCNC: 4 MMOL/L (ref 0–18)
AST SERPL-CCNC: 194 U/L (ref 15–37)
BASOPHILS # BLD AUTO: 0.03 X10(3) UL (ref 0–0.2)
BASOPHILS NFR BLD AUTO: 0.6 %
BILIRUB SERPL-MCNC: 5.3 MG/DL (ref 0.1–2)
BUN BLD-MCNC: 8 MG/DL (ref 9–23)
CALCIUM BLD-MCNC: 8.6 MG/DL (ref 8.5–10.1)
CENTROMERE IGG SER-ACNC: 2 U/ML
CERULOPLASMIN SERPL-MCNC: 30.2 MG/DL (ref 20–60)
CHLORIDE SERPL-SCNC: 105 MMOL/L (ref 98–112)
CO2 SERPL-SCNC: 27 MMOL/L (ref 21–32)
CREAT BLD-MCNC: 0.82 MG/DL
DSDNA IGG SERPL IA-ACNC: 1.7 IU/ML
EBV NA IGG SER QL IA: POSITIVE
EBV VCA IGG SER QL IA: POSITIVE
EBV VCA IGM SER QL IA: NEGATIVE
EGFRCR SERPLBLD CKD-EPI 2021: 126 ML/MIN/1.73M2 (ref 60–?)
ENA AB SER QL IA: 26 UG/L
ENA AB SER QL IA: POSITIVE
ENA JO1 AB SER IA-ACNC: 0.4 U/ML
ENA RNP IGG SER IA-ACNC: 24.5 U/ML
ENA SCL70 IGG SER IA-ACNC: 1.5 U/ML
ENA SM IGG SER IA-ACNC: 0.9 U/ML
ENA SS-A IGG SER IA-ACNC: 120 U/ML
ENA SS-B IGG SER IA-ACNC: 54.4 U/ML
EOSINOPHIL # BLD AUTO: 0.13 X10(3) UL (ref 0–0.7)
EOSINOPHIL NFR BLD AUTO: 2.5 %
ERYTHROCYTE [DISTWIDTH] IN BLOOD BY AUTOMATED COUNT: 13.3 %
GLOBULIN PLAS-MCNC: 4 G/DL (ref 2.8–4.4)
GLUCOSE BLD-MCNC: 99 MG/DL (ref 70–99)
HCT VFR BLD AUTO: 45.7 %
HGB BLD-MCNC: 15.3 G/DL
HIV 1+2 AB+HIV1 P24 AG SERPL QL IA: NONREACTIVE
IMM GRANULOCYTES # BLD AUTO: 0.02 X10(3) UL (ref 0–1)
IMM GRANULOCYTES NFR BLD: 0.4 %
INR BLD: 0.93 (ref 0.8–1.2)
LYMPHOCYTES # BLD AUTO: 2.39 X10(3) UL (ref 1–4)
LYMPHOCYTES NFR BLD AUTO: 46.8 %
MCH RBC QN AUTO: 28.2 PG (ref 26–34)
MCHC RBC AUTO-ENTMCNC: 33.5 G/DL (ref 31–37)
MCV RBC AUTO: 84.3 FL
MONOCYTES # BLD AUTO: 0.4 X10(3) UL (ref 0.1–1)
MONOCYTES NFR BLD AUTO: 7.8 %
NEUTROPHILS # BLD AUTO: 2.14 X10 (3) UL (ref 1.5–7.7)
NEUTROPHILS # BLD AUTO: 2.14 X10(3) UL (ref 1.5–7.7)
NEUTROPHILS NFR BLD AUTO: 41.9 %
OSMOLALITY SERPL CALC.SUM OF ELEC: 280 MOSM/KG (ref 275–295)
PLATELET # BLD AUTO: 188 10(3)UL (ref 150–450)
POTASSIUM SERPL-SCNC: 4.2 MMOL/L (ref 3.5–5.1)
PROT SERPL-MCNC: 7.4 G/DL (ref 6.4–8.2)
PROTHROMBIN TIME: 12.5 SECONDS (ref 11.6–14.8)
RBC # BLD AUTO: 5.42 X10(6)UL
SODIUM SERPL-SCNC: 136 MMOL/L (ref 136–145)
U1 SNRNP IGG SER IA-ACNC: 50.8 U/ML
WBC # BLD AUTO: 5.1 X10(3) UL (ref 4–11)

## 2024-06-17 PROCEDURE — 99232 SBSQ HOSP IP/OBS MODERATE 35: CPT | Performed by: INTERNAL MEDICINE

## 2024-06-17 PROCEDURE — 74183 MRI ABD W/O CNTR FLWD CNTR: CPT | Performed by: INTERNAL MEDICINE

## 2024-06-17 PROCEDURE — 76376 3D RENDER W/INTRP POSTPROCES: CPT | Performed by: INTERNAL MEDICINE

## 2024-06-17 RX ORDER — GADOTERATE MEGLUMINE 376.9 MG/ML
20 INJECTION INTRAVENOUS
Status: COMPLETED | OUTPATIENT
Start: 2024-06-17 | End: 2024-06-17

## 2024-06-17 NOTE — PLAN OF CARE
Problem: GASTROINTESTINAL - ADULT  Goal: Minimal or absence of nausea and vomiting  Description: INTERVENTIONS:  - Maintain adequate hydration with IV or PO as ordered and tolerated  - Nasogastric tube to low intermittent suction as ordered  - Evaluate effectiveness of ordered antiemetic medications  - Provide nonpharmacologic comfort measures as appropriate  - Advance diet as tolerated, if ordered  - Obtain nutritional consult as needed  - Evaluate fluid balance  Outcome: Progressing     Problem: GASTROINTESTINAL - ADULT  Goal: Maintains or returns to baseline bowel function  Description: INTERVENTIONS:  - Assess bowel function  - Maintain adequate hydration with IV or PO as ordered and tolerated  - Evaluate effectiveness of GI medications  - Encourage mobilization and activity  - Obtain nutritional consult as needed  - Establish a toileting routine/schedule  - Consider collaborating with pharmacy to review patient's medication profile  Outcome: Progressing

## 2024-06-17 NOTE — PROGRESS NOTES
Patient A&Ox4.   VSS on RA.   No tele.   Patient denies pain at this time.   Patient denies any N/V at this time.   Tolerating regular diet.   NPO @0000  Ambulates well, up ad-shayla  LR @100 mL/hr    GI panel showing Norovirus.   Enteric/Contact Plus precautions in place.   Dr. Dempsey notified of results  Plan for  MRI abdomen and MRCP 6/17.     Updated patient on plan of care, pt verbalizes understanding.   Call light within reach.

## 2024-06-17 NOTE — PROGRESS NOTES
[facility]   part of [facility]     Hospitalist Progress Note     Mauricio Joyner Patient Status:  Inpatient    2000 MRN [id number]   [facility] 3NE-A Attending Krystina Bailon, DO   Hosp Day # 1 PCP Raiza Browne MD     Chief Complaint: n/v/d, jaundice     Subjective:     Patient feeling much better this morning, denies any abdominal pain/n/v/d/fevers/chills    Objective:    Review of Systems:   A comprehensive review of systems was completed; pertinent positive and negatives stated in subjective.    Vital signs:  Temp:  [97.5 °F (36.4 °C)-97.9 °F (36.6 °C)] 97.5 °F (36.4 °C)  Pulse:  [67-93] 67  Resp:  [16-26] 16  BP: (118-172)/() 137/82  SpO2:  [97 %-100 %] 99 %    Physical Exam:    General: No acute distress, mild scleral icterus   Respiratory: No wheezes, no rhonchi  Cardiovascular: S1, S2, regular rate and rhythm  Abdomen: Soft, Non-tender, non-distended, positive bowel sounds  Neuro: No new focal deficits.   Extremities: No edema    Diagnostic Data:    Labs:  Recent Labs   Lab 24  0939 24  0603   WBC 4.9 5.1   HGB 17.4 15.3   MCV 83.0 84.3   .0 188.0   INR 0.94 0.93       Recent Labs   Lab 24  0939 24  0603   * 99   BUN 8* 8*   CREATSERUM 0.97 0.82   CA 8.8 8.6   ALB 3.9 3.4   * 136   K 3.7 4.2    105   CO2 26.0 27.0   ALKPHO 333* 285*   * 194*   * 405*   BILT 5.3* 5.3*   TP 8.5* 7.4       Estimated Creatinine Clearance: 138.9 mL/min (based on SCr of 0.82 mg/dL).    No results for input(s): \"TROP\", \"TROPHS\", \"CK\" in the last 168 hours.    Recent Labs   Lab 24  0939 24  0603   PTP 12.6 12.5   INR 0.94 0.93                  Microbiology    No results found for this visit on 24.      Imaging: Reviewed in Epic.    Medications:    enoxaparin  40 mg Subcutaneous Daily       Assessment & Plan:      #Acute hepatitis, suspect viral etiology  #N/v/d d/t acute norovirus infection   - abdominal  ultrasound reviewed > diffuse fatty inffiltration  - GI stool PCR +for norovirus infection  - acute hepatitis panel negative  - autoimmune hepatitis w/u underway  - MRCP ordered  - supportive care, IVF, anti-emetics PRN  - trend LFT's > improving/stable this morning  - GI following     #Hyperglycemia  #Prediabetes  - HgbA1c 6.2%     #Elevated BP without prior diagnosis of HTN  - suspect stress mediated  - monitor BP's during admission      Krystina Bailon,     Supplementary Documentation:     Quality:  DVT Mechanical Prophylaxis:   SCDs,    DVT Pharmacologic Prophylaxis   Medication    enoxaparin (Lovenox) 40 MG/0.4ML SUBQ injection 40 mg                Code Status: Not on file  Cuello: No urinary catheter in place  Cuello Duration (in days):   Central line:    FABIANA:     Discharge is dependent on: clinical state, LFT's  At this point Mr. Joyner is expected to be discharge to: home    The 21st Century Cures Act makes medical notes like these available to patients in the interest of transparency. Please be advised this is a medical document. Medical documents are intended to carry relevant information, facts as evident, and the clinical opinion of the practitioner. The medical note is intended as peer to peer communication and may appear blunt or direct. It is written in medical language and may contain abbreviations or verbiage that are unfamiliar.

## 2024-06-17 NOTE — PLAN OF CARE
Assume care @ 0730  AO X4, Verbally responsive, RA  Denies pain. Pt verbalized feeling better.   Continent. Up ad shayla  IVFs LR@ 100ml/hr. NPO  MRCP result pending.   Fall and safety precautions in place      Problem: Patient/Family Goals  Goal: Patient/Family Long Term Goal  Description: Patient's Long Term Goal:   Interventions:  -   - See additional Care Plan goals for specific interventions  Outcome: Progressing  Goal: Patient/Family Short Term Goal  Description: Patient's Short Term Goal:     Interventions:   -  - See additional Care Plan goals for specific interventions  Outcome: Progressing     Problem: GASTROINTESTINAL - ADULT  Goal: Minimal or absence of nausea and vomiting  Description: INTERVENTIONS:  - Maintain adequate hydration with IV or PO as ordered and tolerated  - Nasogastric tube to low intermittent suction as ordered  - Evaluate effectiveness of ordered antiemetic medications  - Provide nonpharmacologic comfort measures as appropriate  - Advance diet as tolerated, if ordered  - Obtain nutritional consult as needed  - Evaluate fluid balance  Outcome: Progressing  Goal: Maintains or returns to baseline bowel function  Description: INTERVENTIONS:  - Assess bowel function  - Maintain adequate hydration with IV or PO as ordered and tolerated  - Evaluate effectiveness of GI medications  - Encourage mobilization and activity  - Obtain nutritional consult as needed  - Establish a toileting routine/schedule  - Consider collaborating with pharmacy to review patient's medication profile  Outcome: Progressing

## 2024-06-17 NOTE — PROGRESS NOTES
Gastroenterology Progress Note  Mauricio Joyner Patient Status:  Inpatient    2000 MRN [id number]   Location [facility] 3NE-A Attending Krystina Bailon, DO   Hosp Day # 1 PCP Raiza Browne MD     Chief Complaint: Elevated LFTs  S: Pt reports marked improvement--no nausea, vomiting, or abd pain. 1 loose non-bloody stools   No change in mentation.   O: /82 (BP Location: Left arm)   Pulse 67   Temp 97.5 °F (36.4 °C) (Oral)   Resp 16   Ht 5' 9\" (1.753 m)   Wt 258 lb (117 kg)   SpO2 99%   BMI 38.10 kg/m²   Gen: AAOx3  CV: RRR with normal S1 / S2  Resp: CTA bilaterally; No increase in respiratory effort   Abd: (+)BS, soft, non-tender, non-distended; no rebound or guarding  Ext: No edema or cyanosis  Skin: Warm and dry; mild jaundice   Neuro: no asterixis   Laboratory Data:     No results for input(s): \"PGLU\" in the last 168 hours.  Recent Labs   Lab 24  0939 24  0603   INR 0.94 0.93         Recent Labs   Lab 24  0939 24  0603   WBC 4.9 5.1   HGB 17.4 15.3   .0 188.0       Recent Labs   Lab 24  0939 24  0603   * 136   K 3.7 4.2    105   CO2 26.0 27.0   BUN 8* 8*   CREATSERUM 0.97 0.82       Recent Labs   Lab 24  0939 24  0603   * 405*   * 194*     Assessment: 23 yr-old male admitted yesterday with jaundice which started after nausea, poor PO intake. LFTs stable with mild improvement in transaminase levels. No bleeding or confusion noted. Synthetic function remains intact Hepatitis serology neg, EBV IGM neg. Will also check HIV + Hepatitis E and await MRCP results, autoimmune serology   Plan:   Await MRCP results   Await autoimmune serology which is in process  Check Hepatitis E + HIV (pt consented to testing) to blood in lab  OK to resume general diet from a GI perspective  Monitor for acute change in mentation--if confusion develops notify GI   DVT prophylaxis per  Hospitalist recommendations   CBC, CMP, INR in AM  Attending addendum (Dr Rea) to follow later today and provide formal, final recommendations at that time   SAMANTA Zhao  10:56 AM  6/17/2024  [facility]  [phone]

## 2024-06-18 VITALS
SYSTOLIC BLOOD PRESSURE: 107 MMHG | HEART RATE: 74 BPM | RESPIRATION RATE: 14 BRPM | HEIGHT: 69 IN | TEMPERATURE: 98 F | DIASTOLIC BLOOD PRESSURE: 45 MMHG | OXYGEN SATURATION: 99 % | WEIGHT: 258 LBS | BODY MASS INDEX: 38.21 KG/M2

## 2024-06-18 LAB
ACTIN SMOOTH MUSCLE AB: 5 UNITS
ALBUMIN SERPL-MCNC: 3.2 G/DL (ref 3.4–5)
ALBUMIN/GLOB SERPL: 0.8 {RATIO} (ref 1–2)
ALP LIVER SERPL-CCNC: 268 U/L
ALT SERPL-CCNC: 470 U/L
ANION GAP SERPL CALC-SCNC: 7 MMOL/L (ref 0–18)
AST SERPL-CCNC: 271 U/L (ref 15–37)
BILIRUB SERPL-MCNC: 6.1 MG/DL (ref 0.1–2)
BUN BLD-MCNC: 7 MG/DL (ref 9–23)
CALCIUM BLD-MCNC: 8.8 MG/DL (ref 8.5–10.1)
CHLORIDE SERPL-SCNC: 106 MMOL/L (ref 98–112)
CMV IGG AB: >10 U/ML
CMV IGM AB: <30 AU/ML
CO2 SERPL-SCNC: 25 MMOL/L (ref 21–32)
CREAT BLD-MCNC: 0.74 MG/DL
EGFRCR SERPLBLD CKD-EPI 2021: 130 ML/MIN/1.73M2 (ref 60–?)
ERYTHROCYTE [DISTWIDTH] IN BLOOD BY AUTOMATED COUNT: 13.4 %
GLOBULIN PLAS-MCNC: 3.9 G/DL (ref 2.8–4.4)
GLUCOSE BLD-MCNC: 102 MG/DL (ref 70–99)
HCT VFR BLD AUTO: 43.9 %
HGB BLD-MCNC: 14.6 G/DL
IGG SUBCLASS 1: 747 MG/DL
IGG SUBCLASS 2: 506 MG/DL
IGG SUBCLASS 3: 73 MG/DL
IGG SUBCLASS 4: 17 MG/DL
INR BLD: 0.99 (ref 0.8–1.2)
M2 MITOCHONDRIAL AB: <20 UNITS
MCH RBC QN AUTO: 27.6 PG (ref 26–34)
MCHC RBC AUTO-ENTMCNC: 33.3 G/DL (ref 31–37)
MCV RBC AUTO: 83 FL
OSMOLALITY SERPL CALC.SUM OF ELEC: 284 MOSM/KG (ref 275–295)
PLATELET # BLD AUTO: 231 10(3)UL (ref 150–450)
POTASSIUM SERPL-SCNC: 4.1 MMOL/L (ref 3.5–5.1)
PROT SERPL-MCNC: 7.1 G/DL (ref 6.4–8.2)
PROTHROMBIN TIME: 13.1 SECONDS (ref 11.6–14.8)
RBC # BLD AUTO: 5.29 X10(6)UL
SODIUM SERPL-SCNC: 138 MMOL/L (ref 136–145)
WBC # BLD AUTO: 4.2 X10(3) UL (ref 4–11)

## 2024-06-18 PROCEDURE — 99239 HOSP IP/OBS DSCHRG MGMT >30: CPT | Performed by: INTERNAL MEDICINE

## 2024-06-18 NOTE — DISCHARGE INSTRUCTIONS
YOU NEED FOLLOW-UP BLOOD TEST TO CHECK YOUR LIVER NUMBERS IN 2 DAYS. An order is placed for you in the [facility] system, please get this drawn

## 2024-06-18 NOTE — DISCHARGE SUMMARY
[facility]     Mauricio Joyner Patient Status:  Inpatient    2000 MRN [id number]   Location [facility] 3NE-A Attending No att. providers found   Hosp Day # 2 PCP Raiza Browne MD     Date of Admission: 2024  Date of Discharge:  2024   Discharge Disposition: Home or Self Care    Discharge Diagnosis:  #Acute hepatitis, suspect viral etiology  #N/v/d d/t acute norovirus infection   #Hyperglycemia  #Prediabetes; HgbA1c 6.2%  #Elevated BP without prior diagnosis of HTN    History of Present Illness: Mauricio Joyner is a 23 year old male with no significant pmhx who presents with complaint of several days of nausea, NBNB emesis, and multiple episodes of loose stools. He reports he developed malaise, fatigue, HA's and felt feverish (did not check his temperatures) 5 days ago. He had decreased appetite and poor PO intake the following days as well as continued to feel feverish/HA's. Had episode of loose/watery stools and nausea a few days ago which has since resolved. This morning, he felt he looked jaundiced when he looked in the mirror and he noted that his urine was much darker than normal and his stools were hank colored. He came to ED for further evaluation. His sister has history of autoimmune hepatitis which was diagnosed after she began using herbal supplements. He has another sister who has history of lupus. He started taking a supplement called \"bloom\" last week, but did not use it this week. No other herbal supplements. No recent sick contacts that he is aware of, or consumption of raw/undercooked meat. He rarely consumes EtOH.     Brief Synopsis: admitted with jaundice, transaminitis. GI was consulted. His MRCP was negative for biliary obstruction. He tested positive for norovirus infection. He was treated supportively. Acute hepatitis panel was negative. His autoimmune hepatitis panel was pending at time of discharge. He was discharged to home with plan to  repeat LFT's in 48 hours and follow up closely with GI in outpt setting. He was also encouraged to f/u with PCP after discharge.     Lace+ Score: 55  59-90 High Risk  29-58 Medium Risk  0-28   Low Risk  Patient was referred to the [facility].    TCM Follow-Up Recommendation:  LACE 29-58: Moderate Risk of readmission after discharge from the hospital.      Procedures during hospitalization:   none    Incidental or significant findings and recommendations (brief descriptions):  As above    Lab/Test results pending at Discharge:   none    Consultants:  GI    Discharge Medication List:     Discharge Medications        CONTINUE taking these medications        Instructions Prescription details   ondansetron 4 MG Tbdp  Commonly known as: Zofran-ODT      Take 1 tablet (4 mg total) by mouth every 4 (four) hours as needed for Nausea.   Stop taking on: June 22, 2024  Quantity: 10 tablet  Refills: 0     One-Daily Multi Vitamins Tabs      Take 1 tablet by mouth daily.   Refills: 0     VITAMIN B-12 OR      Take 1 tablet by mouth daily.   Refills: 0     VITAMIN C OR      Take 1 tablet by mouth daily.   Refills: 0              [place]    Follow-up appointment:   Raiza Browne MD  [address] [facility] [address]  [address]  [facility] [id number] [phone]    Follow up in 1 week(s)      Hugo Rea MD  [address]   [facility] [id number] [phone]    Follow up      Appointments for Next 30 Days 6/20/2024 - 7/20/2024        Date Arrival Time Visit Type Length Department Provider     6/21/2024 11:45 AM  [facility] PREPROCEDURE LAB [4157] 15 min [facility]    Patient Instructions:     This lab appointment is required prior to your scheduled procedure.  It is very important that you arrive on time for this lab appointment.     If you need to cancel, please call [phone].        Location Instructions:     Your appointment is scheduled at [facility].&nbsp; The address is [address] [place] [address].  To reach Outpatient Registration, park in the South Parking Garage and enter the double doors located on the ground floor. Proceed to the lobby past the Information Desk to Outpatient Registration.  [facility] Hours of Operation:  MONDAY-FRIDAY 6:00AM - 8:00PM SATURDAY 6:00AM - 3:00PM SUNDAY 8:AM-12:00PM  Masks are optional for all patients and visitors, unless otherwise indicated.               6/21/2024  1:00 PM  [facility] US BIOPSY LIVER [1560] 60 min [facility] Ultrasound E[facility] US RM 3     6/21/2024  1:00 PM  [facility] US BIOPSY LIVER [1560] 60 min [facility] CT  RADIOLOGIST SPEC CT US     6/21/2024  1:00 PM  [facility] US BIOPSY LIVER [1560] 60 min [facility] X-ray  RN RADIOLOGY     6/21/2024  2:00 PM  [facility] US BIOPSY LIVER [1560] 90 min [facility] Perioperative Service PERIOP RADIOLOGY RM    Patient Instructions:     If you have had prior imaging outside of an [facility], you must provide a copy for this appointment. If we do not receive a copy of your images or you do not bring them to your appointment, you will be rescheduled.    Prior to your arrival:    A nurse will be calling you to do a brief Health History Screen over the phone and give you any further instructions if needed.      If you suspect you may be pregnant, please consult with your physician prior to your exam.     Please make sure an adult is available to drive you home and it is recommended that an adult stays overnight with you after the procedure.     A complete history and physical is required within 30 days of your scheduled procedure.     Medication Instructions:    If you are taking an NSAID (Motrin, Advil or Ibuprofen), please call the ordering physician to see if that medication can be held for 5 days or at the time of scheduling.     If you are taking Aspirin, Plavix, Coumadin (Warfarin) or similar blood thinners,  please call the ordering physician to see if that medication can be held for 5 days prior to the procedure.     It is important for Radiology to be notified if you are not able to hold any of the above medications. For [facility] please call [phone]. For [facility] please call [phone]. The radiologist may need to contact the physician, as this may affect the exam/procedure.     All herbal medication should be held at the time of scheduling/screening.      Day of procedure:    After you register, you will be escorted directly to the radiology department.      Please arrive on time for your lab appointment (60 minutes prior to the scheduled procedure time).     Dietary Instructions:    Please have nothing to eat or drink for 6 hours prior to your procedure except for your usual medication with a small amount of water.       The estimated duration of your visit could take up to 6 hours, which includes recovery time after the procedure.      Location Instructions:     Your appointment will be at [facility] located at [address] [place] [address].&nbsp; To reach Outpatient Registration, park in the South Parking Garage and enter the double doors located on the ground floor.&nbsp; Proceed to the lobby past the Information Desk to Outpatient Registration. The phone number for this location is [phone].  [facility] reserves the right to restrict and prohibit the use of video, recording, and photography devices while on the premises. In order to protect the safety and privacy of our patients and staff, no photography, videotaping, or audio recording is allowed in any [facility] department without prior authorization.    Because of the nature of the Emergency Department/Immediate Care, please be advised of the possibility your appointment may be delayed.    Please bring your insurance card and photo ID. You will also need to bring your doctor's order unless your  physician's office submitted the order electronically or faxed the order. Without the order, your test may be delayed or postponed.  Children: Children under the age of 12 must have another adult caregiver with them.  Please do not bring your child/children without a caregiver.  Because of the highly sensitive equipment and privacy of all our patients, children will not be permitted in the exam rooms, unless otherwise noted and in accordance with departmental policy.   PATIENT RESPONSIBILITY ESTIMATE  - (Estimate) We will provide you with your estimated remaining deductible and coinsurance balance for your services at the time of check in.  - (Payment) Please be aware that you may be asked for payment at the time of service.  Masks are optional for all patients and visitors, unless otherwise indicated.               7/11/2024 12:40 PM  MYCHART EXAM [3314] 40 min [facility], YESSENIA Garner, Raiza Joseph MD    Patient Instructions:     Please arrive 15 minutes prior to your scheduled appointment. Please also bring your Insurance card, Photo ID, and your medication bottles or a list of your current medication.    If your condition improves and this appointment is no longer needed, please contact your physician office to cancel.    Please verify with your primary care provider if your insurance requires a referral.        Location Instructions:     Masks are optional for all patients and visitors, unless otherwise indicated.                      Vital signs:   /45 (BP Location: Left arm)   Pulse 74   Temp 98.4 °F (36.9 °C) (Oral)   Resp 14   Ht 5' 9\" (1.753 m)   Wt 258 lb (117 kg)   SpO2 99%   BMI 38.10 kg/m²       Physical Exam:    General: No acute distress   Lungs: clear to auscultation  Cardiovascular: S1, S2  Abdomen: Soft      -----------------------------------------------------------------------------------------------  PATIENT DISCHARGE INSTRUCTIONS: See electronic  chart    Krystina Bailon DO    Total time spent on discharge plannin minutes     The  [employer] Cures Act makes medical notes like these available to patients in the interest of transparency. Please be advised this is a medical document. Medical documents are intended to carry relevant information, facts as evident, and the clinical opinion of the practitioner. The medical note is intended as peer to peer communication and may appear blunt or direct. It is written in medical language and may contain abbreviations or verbiage that are unfamiliar.

## 2024-06-18 NOTE — PLAN OF CARE
Pt is A&O x4. VSS. RA. Pt denies pain/nausea/SOB/dizziness. IVF- LR @ 100mL/hr infusing to RAC PIV. Isolation precaution maintained. General diet. Up ad shayla. Pt showered this shift.   Plan for possible discharge tomorrow.        Problem: GASTROINTESTINAL - ADULT  Goal: Minimal or absence of nausea and vomiting  Description: INTERVENTIONS:  - Maintain adequate hydration with IV or PO as ordered and tolerated  - Nasogastric tube to low intermittent suction as ordered  - Evaluate effectiveness of ordered antiemetic medications  - Provide nonpharmacologic comfort measures as appropriate  - Advance diet as tolerated, if ordered  - Obtain nutritional consult as needed  - Evaluate fluid balance  Outcome: Progressing  Goal: Maintains or returns to baseline bowel function  Description: INTERVENTIONS:  - Assess bowel function  - Maintain adequate hydration with IV or PO as ordered and tolerated  - Evaluate effectiveness of GI medications  - Encourage mobilization and activity  - Obtain nutritional consult as needed  - Establish a toileting routine/schedule  - Consider collaborating with pharmacy to review patient's medication profile  Outcome: Progressing

## 2024-06-18 NOTE — PROGRESS NOTES
Assumed care around [address]. AOX4, VSS, afebrile. RA. Tele. IVF. IND in room. No acute events overnight. LFTs down trending. 2BM reported per chart 6/17. Will follow

## 2024-06-18 NOTE — PLAN OF CARE
Assumed care 0730.  Alert and oriented x4.  RA  No tele  BM today.   Denies pain  Discharge today  Continue to monitor pt    Problem: GASTROINTESTINAL - ADULT  Goal: Minimal or absence of nausea and vomiting  Description: INTERVENTIONS:  - Maintain adequate hydration with IV or PO as ordered and tolerated  - Nasogastric tube to low intermittent suction as ordered  - Evaluate effectiveness of ordered antiemetic medications  - Provide nonpharmacologic comfort measures as appropriate  - Advance diet as tolerated, if ordered  - Obtain nutritional consult as needed  - Evaluate fluid balance  Outcome: Adequate for Discharge  Goal: Maintains or returns to baseline bowel function  Description: INTERVENTIONS:  - Assess bowel function  - Maintain adequate hydration with IV or PO as ordered and tolerated  - Evaluate effectiveness of GI medications  - Encourage mobilization and activity  - Obtain nutritional consult as needed  - Establish a toileting routine/schedule  - Consider collaborating with pharmacy to review patient's medication profile  Outcome: Adequate for Discharge

## 2024-06-18 NOTE — PROGRESS NOTES
Gastroenterology Progress Note  Patient Name: Mauricio Joyner  Chief Complaint: Norovirus, jaundice  S: The patient reports no abdominal pain.  Less than 5 loose stools over the past 24 hours.  Tolerating a regular diet.   O: /45 (BP Location: Left arm)   Pulse 74   Temp 98.4 °F (36.9 °C) (Oral)   Resp 14   Ht 5' 9\" (1.753 m)   Wt 258 lb (117 kg)   SpO2 99%   BMI 38.10 kg/m²   Gen: AAOx3  Abd: (+)BS, soft, non-tender, non-distended; no rebound or guarding  Ext: No edema or cyanosis  Skin: Warm and dry  Laboratory Data:   Lab Results   Component Value Date    WBC 4.2 06/18/2024    HGB 14.6 06/18/2024    HCT 43.9 06/18/2024    .0 06/18/2024    CREATSERUM 0.74 06/18/2024    BUN 7 06/18/2024     06/18/2024    K 4.1 06/18/2024     06/18/2024    CO2 25.0 06/18/2024     06/18/2024    CA 8.8 06/18/2024    ALB 3.2 06/18/2024    ALKPHO 268 06/18/2024    BILT 6.1 06/18/2024     06/18/2024     06/18/2024     Recent Labs   Lab 06/16/24  0939 06/17/24  0603 06/18/24  0723   * 99 102*   BUN 8* 8* 7*   CREATSERUM 0.97 0.82 0.74   CA 8.8 8.6 8.8   * 136 138   K 3.7 4.2 4.1    105 106   CO2 26.0 27.0 25.0     Recent Labs   Lab 06/17/24  0603 06/18/24  0723   RBC 5.42 5.29   HGB 15.3 14.6   HCT 45.7 43.9   MCV 84.3 83.0   MCH 28.2 27.6   MCHC 33.5 33.3   RDW 13.3 13.4   NEPRELIM 2.14  --    WBC 5.1 4.2   .0 231.0       Recent Labs   Lab 06/16/24  0939 06/17/24  0603 06/18/24  0723   * 405* 470*   * 194* 271*     Procedure       Date/Time   MRI ABDOMEN AND MRCP W/3D (W+W0)(CPT=74183/00521) [[id number]] Collected: 06/17/24 1239   Order Status: Completed Updated: 06/17/24 1240   Narrative:     PROCEDURE:  MRI ABDOMEN&MRCP W/3D (W+W0)(CPT=74183/80559)     COMPARISON:  US TANYA, US ABDOMEN COMPLETE (CPT=76700), 6/16/2024, 11:37 AM.     INDICATIONS:  elevated lfts, thickened GB     TECHNIQUE:  Multiplanar single shot fast spin echo, chemical  shift imaging, breath hold T2 weighted images and 2-D fiesta sequences were acquired. Fluid sensitive imaging with MRCP reconstruction was also performed including 3D multi-projection imaging  (non independent workstation).  Both projection and source MRCP images are evaluated.  Subsequent post contrast imaging was performed after intravenous administration of paramagnetic contrast agent.     3-D RENDERING:  Three dimensional image processing was completed using a separate workstation under concurrent supervision. Images were archived.     PATIENT STATED HISTORY:(As transcribed by Technologist)  Elevated lfts and thickened GB. Patient states having abdominal pain      CONTRAST USED:  20mL of Dotarem     FINDINGS:    LIVER:  There is diffuse dropout of signal on out of phase imaging consistent with fatty infiltration of the liver.  There is no focal mass lesion.  Post-contrast imaging of the liver is grossly unremarkable.  BILIARY:  The gallbladder is contracted.  There is no gallbladder wall inflammation.  The common bile duct is normal.  No intrahepatic ductal dilatation.  PANCREAS:  No lesion, fluid collection, ductal dilatation, or atrophy.    SPLEEN:  No enlargement or focal lesion.    KIDNEYS:  No mass or obstruction.    ADRENALS:  No mass or enlargement.    AORTA/VASCULAR:  No aneurysm or dissection.    RETROPERITONEUM:  No mass or adenopathy.    BOWEL/MESENTERY:  No visible mass, obstruction, or bowel wall thickening.    ABDOMINAL WALL:  No mass or hernia.    BONES:  No bony lesion or fracture.    LUNG BASES:  No visible pleural disease.  Lung bases not well assessed with MRI.                     Impression:     CONCLUSION:       1. Fatty infiltration of the liver.       2. Gallbladder is contracted.  There is no evidence of biliary obstruction.       LOCATION:  [facility]           Dictated by (CST): Napoleon Hampton MD on 6/17/2024 at 12:15 PM      Finalized by (CST): Napoleon Hampton MD on 6/17/2024 at 12:39 PM          Assessment: This is a patient who presented with jaundice, diarrhea, and now diagnosed with Norovirus.  His liver chemistries have been fluctuating, but his Tbili has risen.  No evidence of coagulopathy or encephalopathy.  MRCP indicates no biliary obstruction.   Diarrhea is improving in frequency.  Tolerating diet.   Plan:   1) Plan discharge home today  2) Repeat LFT's in 48 hours  3) If continuing to rise, would do liver biopsy  4) Will send HSV pcr.  Also awaiting other labs ordered and in process.

## 2024-06-19 ENCOUNTER — PATIENT OUTREACH (OUTPATIENT)
Dept: CASE MANAGEMENT | Age: 24
End: 2024-06-19

## 2024-06-19 DIAGNOSIS — Z02.9 ENCOUNTERS FOR ADMINISTRATIVE PURPOSE: ICD-10-CM

## 2024-06-19 DIAGNOSIS — R17 JAUNDICE: Primary | ICD-10-CM

## 2024-06-19 NOTE — PROGRESS NOTES
NCM attempted to contact pt for TCM, no answer, call continued to ring, no VM option, unable to leave VM. NCM will try calling back later.

## 2024-06-19 NOTE — PROGRESS NOTES
Initial Post Discharge Follow Up   Discharge Date: 6/18/24  Contact Date: 6/19/2024    Consent Verification:  Assessment Completed With: Patient  HIPAA Verified?  Yes    Discharge Dx:   Jaundice        General:   How have you been since your discharge from the hospital?  I'm feeling really great. Stool is still runny. No pain, no nausea I just ate lunch and am keeping it in.   Do you have any pain since discharge?  No    When you were leaving the hospital were your discharge instructions reviewed with you? Yes  How well were your discharge instructions explained to you?   On a scale of 1-5   1- Very Poor and 5- Very well   Very Well  Do you have any questions about your discharge instructions?  No  Before leaving the hospital was your diagnoses explained to you? Yes  Do you have any questions about your diagnoses? No  Are you able to perform normal daily activities of living as you have prior to your hospital stay (dressing, bathing, ambulating to the bathroom, etc)? yes  (NCM) Was patient given a different diet per AVS? no      Medications:   Current Outpatient Medications   Medication Sig Dispense Refill    Multiple Vitamin (ONE-DAILY MULTI VITAMINS) Oral Tab Take 1 tablet by mouth daily.      ondansetron 4 MG Oral Tablet Dispersible Take 1 tablet (4 mg total) by mouth every 4 (four) hours as needed for Nausea. 10 tablet 0    Cyanocobalamin (VITAMIN B-12 OR) Take 1 tablet by mouth daily. (Patient not taking: Reported on 6/16/2024)      Ascorbic Acid (VITAMIN C OR) Take 1 tablet by mouth daily. (Patient not taking: Reported on 6/16/2024)       Were there any changes to your current medication(s) noted on the AVS? No  Let's go over your medications together to make sure we are not missing anything. Medications Reviewed  Are there any reasons that keep you from taking your medication as prescribed? No  Are you having any concerns with constipation? No      Discharge medications reviewed/discussed/and reconciled  against outpatient medications with patient.  Any changes or updates to medications sent to PCP.  Patient Acknowledged     Referrals/orders at D/C:  Referrals/orders placed at D/C? no    DME ordered at D/C? No      Discharge orders, AVS reviewed and discussed with patient. Any changes or updates to orders sent to PCP.  Patient Acknowledged      SDOH:   Transportation Needs: No Transportation Needs (6/16/2024)    Transportation Needs     Lack of Transportation: No     Car Seat: Not on file     Financial Resource Strain: Low Risk  (6/19/2024)    Financial Resource Strain     Difficulty of Paying Living Expenses: Not very hard     Med Affordability: No       Follow up appointments:      Your appointments       Date & Time Appointment Department (Nora)    Jul 11, 2024 12:40 PM CDT Office Visit with Raiza Browne MD [facility])    Please arrive 15 minutes prior to your scheduled appointment. Please also bring your Insurance card, Photo ID, and your medication bottles or a list of your current medication.    If your condition improves and this appointment is no longer needed, please contact your physician office to cancel.    Please verify with your primary care provider if your insurance requires a referral.              [facility] [address]  [address] [facility] [address] [id number] [phone]            TCC  Was TCC ordered: Yes  Was TCC scheduled: No, Explain-prefers to see PCP      PCP (If no TCC appointment)  Does patient already have a PCP appointment scheduled? No  NCM Scheduled PCP office TCM appointment with patient      Specialist    Does the patient have any other follow up appointment(s) needing to be scheduled? Yes  If yes: [facility] reviewed upcoming specialist appointment with patient: Yes  Does the patient need assistance scheduling appointment(s):  No, pt states that he needs to see PCP before he can schedule with GI. Appt with PCP is on 6/21/24.     Is there any reason as to why you cannot make your appointment(s)?  No     Needs post D/C:   Now that you are home, are there any needs or concerns you need addressed before your next visit with your PCP?  (DME, meds, questions, etc.): No    Interventions by NCM:   NCM reviewed discharge instructions and when to seek medical attention with the patient. He states that he is feeling pretty good. He still has some diarrhea but that is unchanged. He denies having any further pain. He states that he ate today and was able to keep it down. He denied having any fever, n/v, sob, pain, lightheadedness, HA or any new or worsening symptoms. Med review completed. Pt confirms that he will get blood work completed tomorrow. He denied having any questions or concerns at this time.       [facility] referral placed:    No    BOOK BY DATE: 7/2/24

## 2024-06-20 ENCOUNTER — LAB ENCOUNTER (OUTPATIENT)
Dept: LAB | Age: 24
End: 2024-06-20
Attending: NURSE PRACTITIONER

## 2024-06-20 DIAGNOSIS — R17 JAUNDICE: ICD-10-CM

## 2024-06-20 DIAGNOSIS — B17.9 ACUTE HEPATITIS: ICD-10-CM

## 2024-06-20 LAB
ALBUMIN SERPL-MCNC: 3.7 G/DL (ref 3.4–5)
ALBUMIN/GLOB SERPL: 0.9 {RATIO} (ref 1–2)
ALP LIVER SERPL-CCNC: 299 U/L
ALT SERPL-CCNC: 574 U/L
ANION GAP SERPL CALC-SCNC: 5 MMOL/L (ref 0–18)
AST SERPL-CCNC: 204 U/L (ref 15–37)
BILIRUB SERPL-MCNC: 7.7 MG/DL (ref 0.1–2)
BUN BLD-MCNC: 10 MG/DL (ref 9–23)
CALCIUM BLD-MCNC: 9 MG/DL (ref 8.5–10.1)
CHLORIDE SERPL-SCNC: 106 MMOL/L (ref 98–112)
CO2 SERPL-SCNC: 24 MMOL/L (ref 21–32)
CREAT BLD-MCNC: 0.79 MG/DL
EGFRCR SERPLBLD CKD-EPI 2021: 127 ML/MIN/1.73M2 (ref 60–?)
GLOBULIN PLAS-MCNC: 4.2 G/DL (ref 2.8–4.4)
GLUCOSE BLD-MCNC: 91 MG/DL (ref 70–99)
OSMOLALITY SERPL CALC.SUM OF ELEC: 279 MOSM/KG (ref 275–295)
POTASSIUM SERPL-SCNC: 4.2 MMOL/L (ref 3.5–5.1)
PROT SERPL-MCNC: 7.9 G/DL (ref 6.4–8.2)
SODIUM SERPL-SCNC: 135 MMOL/L (ref 136–145)

## 2024-06-20 PROCEDURE — 80053 COMPREHEN METABOLIC PANEL: CPT

## 2024-06-20 PROCEDURE — 36415 COLL VENOUS BLD VENIPUNCTURE: CPT

## 2024-06-21 ENCOUNTER — NURSE ONLY (OUTPATIENT)
Dept: LAB | Facility: HOSPITAL | Age: 24
End: 2024-06-21
Attending: INTERNAL MEDICINE

## 2024-06-21 ENCOUNTER — HOSPITAL ENCOUNTER (OUTPATIENT)
Dept: ULTRASOUND IMAGING | Facility: HOSPITAL | Age: 24
Discharge: HOME | End: 2024-06-21
Attending: INTERNAL MEDICINE

## 2024-06-21 VITALS
TEMPERATURE: 98 F | RESPIRATION RATE: 18 BRPM | DIASTOLIC BLOOD PRESSURE: 72 MMHG | SYSTOLIC BLOOD PRESSURE: 117 MMHG | OXYGEN SATURATION: 98 % | HEART RATE: 57 BPM

## 2024-06-21 DIAGNOSIS — R79.89 ABNORMAL LIVER FUNCTION TESTS: ICD-10-CM

## 2024-06-21 DIAGNOSIS — R79.89 ABNORMAL LIVER FUNCTION TEST: Primary | ICD-10-CM

## 2024-06-21 DIAGNOSIS — R79.89 ABNORMAL LIVER FUNCTION TEST: ICD-10-CM

## 2024-06-21 DIAGNOSIS — A08.11 NOROVIRUS: ICD-10-CM

## 2024-06-21 LAB
INR BLD: 0.97 (ref 0.8–1.2)
PROTHROMBIN TIME: 12.9 SECONDS (ref 11.6–14.8)

## 2024-06-21 PROCEDURE — 36591 DRAW BLOOD OFF VENOUS DEVICE: CPT

## 2024-06-21 PROCEDURE — 88313 SPECIAL STAINS GROUP 2: CPT | Performed by: INTERNAL MEDICINE

## 2024-06-21 PROCEDURE — 85610 PROTHROMBIN TIME: CPT

## 2024-06-21 PROCEDURE — 36415 COLL VENOUS BLD VENIPUNCTURE: CPT

## 2024-06-21 PROCEDURE — 88307 TISSUE EXAM BY PATHOLOGIST: CPT | Performed by: INTERNAL MEDICINE

## 2024-06-21 RX ORDER — MIDAZOLAM HYDROCHLORIDE 1 MG/ML
INJECTION INTRAMUSCULAR; INTRAVENOUS
Status: COMPLETED
Start: 2024-06-21 | End: 2024-06-21

## 2024-06-21 RX ORDER — MIDAZOLAM HYDROCHLORIDE 1 MG/ML
1 INJECTION INTRAMUSCULAR; INTRAVENOUS EVERY 5 MIN PRN
Status: ACTIVE | OUTPATIENT
Start: 2024-06-21 | End: 2024-06-21

## 2024-06-21 RX ORDER — IBUPROFEN 400 MG/1
400 TABLET ORAL ONCE
Status: COMPLETED | OUTPATIENT
Start: 2024-06-21 | End: 2024-06-21

## 2024-06-21 RX ORDER — FLUMAZENIL 0.1 MG/ML
0.2 INJECTION INTRAVENOUS AS NEEDED
Status: DISCONTINUED | OUTPATIENT
Start: 2024-06-21 | End: 2024-06-23

## 2024-06-21 RX ORDER — NALOXONE HYDROCHLORIDE 0.4 MG/ML
INJECTION, SOLUTION INTRAMUSCULAR; INTRAVENOUS; SUBCUTANEOUS
Status: DISCONTINUED
Start: 2024-06-21 | End: 2024-06-21 | Stop reason: WASHOUT

## 2024-06-21 RX ORDER — IBUPROFEN 200 MG
TABLET ORAL
Status: COMPLETED
Start: 2024-06-21 | End: 2024-06-21

## 2024-06-21 RX ORDER — NALOXONE HYDROCHLORIDE 0.4 MG/ML
80 INJECTION, SOLUTION INTRAMUSCULAR; INTRAVENOUS; SUBCUTANEOUS AS NEEDED
Status: DISCONTINUED | OUTPATIENT
Start: 2024-06-21 | End: 2024-06-23

## 2024-06-21 RX ORDER — SODIUM CHLORIDE 9 MG/ML
INJECTION, SOLUTION INTRAVENOUS CONTINUOUS
Status: DISCONTINUED | OUTPATIENT
Start: 2024-06-21 | End: 2024-06-23

## 2024-06-21 RX ORDER — FLUMAZENIL 0.1 MG/ML
INJECTION INTRAVENOUS
Status: DISCONTINUED
Start: 2024-06-21 | End: 2024-06-21 | Stop reason: WASHOUT

## 2024-06-21 RX ADMIN — MIDAZOLAM HYDROCHLORIDE 1 MG: 1 INJECTION INTRAMUSCULAR; INTRAVENOUS at 13:54:00

## 2024-06-21 RX ADMIN — MIDAZOLAM HYDROCHLORIDE 1 MG: 1 INJECTION INTRAMUSCULAR; INTRAVENOUS at 15:57:00

## 2024-06-21 RX ADMIN — IBUPROFEN 400 MG: 400 TABLET ORAL at 14:59:00

## 2024-06-21 RX ADMIN — SODIUM CHLORIDE: 9 INJECTION, SOLUTION INTRAVENOUS at 12:56:00

## 2024-06-21 RX ADMIN — IBUPROFEN 400 MG: 200 MG TABLET ORAL at 14:59:00

## 2024-06-21 NOTE — IMAGING NOTE
Assumed care of Mauricio post US liver biopsy. Mauricio tolerated procedure well. Please see flowsheets for vital signs and/or additional information.    Transported pt to Rm 2247 accompanied by Jorge MARIE and Mildred Moura w/ father at bedside. Bedside SBAR given to Estephania Sugar-op RN. Right mid-axillary upper chest site/drsg verified C/D/I.

## 2024-06-21 NOTE — IMAGING NOTE
Received pt to US 5.  Pt verified name and  as well as allergies.  Pt states NPO since .  Pt states he does not take blood thinners.  Lab results of PT/INR and PLT reviewed.  Informed consent obtained by Dr. Enmanuel Taylor.  Pt positioned supine on gurney.  CRM and pulse ox monitoring applied, alarms enabled. Sterile prep and 1% lido to area by Dr. Taylor.  Specimens obtained.  Neosporin/tegaderm dressing applied to site. CDI.  Pt positioned supine on gurney.  Pt drowsy, but easily arousable with voice and appropriately converses with this RN and in no apparent distress.  SBAR called maxine Murillo RN.  Pt transported to room 2247 with belongings. Pt accompanied by this RN, family, and transporter.

## 2024-06-21 NOTE — PROCEDURES
[facility]  Pre-Procedure Note    Name: Mauricio Joyner  MRN#: [id number]  : 2000    Procedure:  Ultrasound Guided Liver Needle Core Biopsy    Indication: Elevated Liver Function Tests    Allergies:    Allergies   Allergen Reactions    Dander HIVES, ITCHING and OTHER (SEE COMMENTS)     Red Eyes      Dust OTHER (SEE COMMENTS)     Nasal Issues - Stuffy Nose, Sneezing         Pertinent Medications:    Is patient on any Aspirin, Coumadin, or any other Anticoagulations/Antiplatelet medications?  no    Adverse Reactions to Medication:  no    Mental Status:  Alert and Oriented      Health Status: Acceptable for Procedure    Impression and Plans:    Elevated liver function tests.  Needle core biopsy requested.    I have reviewed the above information prior to procedure.    I have discussed the risks and benefits and alternatives with the patient/family.  They understand and agree to proceed with plan of care.    Dejuan Taylor MD  2024  2:06 PM

## 2024-06-21 NOTE — DISCHARGE INSTRUCTIONS
Discharge Instructions for Liver Biopsy  You had a procedure called a liver biopsy. A healthcare provider used a special needle to remove a small piece of tissue from your liver. Then it was looked at for signs of damage or disease. A liver biopsy is ordered after other tests have shown that your liver is not working correctly. You may also have a liver biopsy when liver disease is suspected. This is to find out if your liver has too much iron. It can also be done to rule out cancer.   Home care  Follow your healthcare team's instructions. These may include::    Don't drive until your healthcare team says you can.    Remove the bandage covering the biopsy site  24 to 48 hours after the procedure. Follow your provider's instructions.  Rest for 6 hours and take it easy when you arrive home.  Don’t shower until advised. This may be up to 24 hours after the biopsy. You may wash yourself with a sponge or washcloth. When you are able to shower, don’t scrub the site. Gently wash the area and pat it dry. Don't soak in a bathtub until the incision is healed.  Don’t lift anything heavier than  10 pounds for up to 1 week after the procedure, or as advised by your healthcare provider.  Don't do strenuous activities or exercises for up to 1 week after the procedure.  Ask your healthcare provider when you can return to work.  Ask your provider when it's safe to start taking aspirin or blood thinners if you take these medicines.  Follow-up care  Make a follow-up appointment as directed by our staff.  When to call your healthcare provider  Call your healthcare provider immediately if you have any of the following:  Bleeding from the biopsy site  Dizziness or lightheadedness  Sudden or increased shortness of breath  Sudden chest pain  Fever of  100.4°F ( 38.0°C) or higher, or as directed by your healthcare provider  Shaking chills  Yellow eyes or skin  Increasing redness, tenderness, or swelling at the biopsy site  Drainage from  the biopsy site  Opening of the biopsy site  Vomiting blood  Rectal bleeding or bloody stools  Increasing pain, with or without activity, in the liver or belly area, or pain shooting to the right shoulder  Martha last reviewed this educational content on 1/1/2022 © 2000-2024 [employer]. All rights reserved. This information is not intended as a substitute for professional medical care. Always follow your healthcare professional's instructions.

## 2024-06-21 NOTE — IMAGING NOTE
Alonso Martínez in US room 5 for US liver biopsy. Procedure discussed, pre-procedure check list completed, and IV placed. Please see flow-sheets for vital signs and/ or additional information. SBAR given to Vannessa Patel RN

## 2024-06-21 NOTE — PROCEDURES
[facility]    Mauricio Joyner Patient Status:  Outpatient    2000 MRN [id number]   Location [facility] ULTRASOUND Attending Hugo Rea MD   Hosp Day # 0 PCP Raiza Browne MD         Brief Procedure Report    Pre-Operative Diagnosis: Elevated Liver Function Tests    Post-Operative Diagnosis: Same as above.    Procedure Performed: Ultrasound guided needle core biopsy of liver    Anesthesia: 1% lidocaine    EBL: 1cc    Complications: None    Summary of Case: Two 18g Needle core biopsy samples of right lobe of liver obtained without immediate complication.  Full report to follow in PACS.     Dejuan Taylor

## 2024-06-24 ENCOUNTER — LAB ENCOUNTER (OUTPATIENT)
Dept: LAB | Age: 24
End: 2024-06-24
Attending: INTERNAL MEDICINE

## 2024-06-24 DIAGNOSIS — R17 JAUNDICE: ICD-10-CM

## 2024-06-24 DIAGNOSIS — B17.9 ACUTE HEPATITIS: ICD-10-CM

## 2024-06-24 DIAGNOSIS — R79.89 ABNORMAL LIVER FUNCTION TEST: ICD-10-CM

## 2024-06-24 DIAGNOSIS — A08.11 NOROVIRUS: ICD-10-CM

## 2024-06-24 LAB
ALBUMIN SERPL-MCNC: 3.6 G/DL (ref 3.4–5)
ALBUMIN/GLOB SERPL: 0.8 {RATIO} (ref 1–2)
ALP LIVER SERPL-CCNC: 399 U/L
ALT SERPL-CCNC: 293 U/L
ANION GAP SERPL CALC-SCNC: 7 MMOL/L (ref 0–18)
AST SERPL-CCNC: 95 U/L (ref 15–37)
BASOPHILS # BLD AUTO: 0.06 X10(3) UL (ref 0–0.2)
BASOPHILS NFR BLD AUTO: 0.8 %
BILIRUB SERPL-MCNC: 9.2 MG/DL (ref 0.1–2)
BUN BLD-MCNC: 11 MG/DL (ref 9–23)
CALCIUM BLD-MCNC: 9.2 MG/DL (ref 8.5–10.1)
CHLORIDE SERPL-SCNC: 108 MMOL/L (ref 98–112)
CO2 SERPL-SCNC: 22 MMOL/L (ref 21–32)
CREAT BLD-MCNC: 1.02 MG/DL
EGFRCR SERPLBLD CKD-EPI 2021: 105 ML/MIN/1.73M2 (ref 60–?)
EOSINOPHIL # BLD AUTO: 0.12 X10(3) UL (ref 0–0.7)
EOSINOPHIL NFR BLD AUTO: 1.6 %
ERYTHROCYTE [DISTWIDTH] IN BLOOD BY AUTOMATED COUNT: 16.6 %
FASTING STATUS PATIENT QL REPORTED: NO
GLOBULIN PLAS-MCNC: 4.5 G/DL (ref 2.8–4.4)
GLUCOSE BLD-MCNC: 96 MG/DL (ref 70–99)
HCT VFR BLD AUTO: 44.1 %
HGB BLD-MCNC: 14.9 G/DL
IMM GRANULOCYTES # BLD AUTO: 0.06 X10(3) UL (ref 0–1)
IMM GRANULOCYTES NFR BLD: 0.8 %
INR BLD: 0.94 (ref 0.8–1.2)
LYMPHOCYTES # BLD AUTO: 2.12 X10(3) UL (ref 1–4)
LYMPHOCYTES NFR BLD AUTO: 28.4 %
MCH RBC QN AUTO: 28.2 PG (ref 26–34)
MCHC RBC AUTO-ENTMCNC: 33.8 G/DL (ref 31–37)
MCV RBC AUTO: 83.5 FL
MONOCYTES # BLD AUTO: 0.97 X10(3) UL (ref 0.1–1)
MONOCYTES NFR BLD AUTO: 13 %
NEUTROPHILS # BLD AUTO: 4.14 X10 (3) UL (ref 1.5–7.7)
NEUTROPHILS # BLD AUTO: 4.14 X10(3) UL (ref 1.5–7.7)
NEUTROPHILS NFR BLD AUTO: 55.4 %
OSMOLALITY SERPL CALC.SUM OF ELEC: 283 MOSM/KG (ref 275–295)
PLATELET # BLD AUTO: 405 10(3)UL (ref 150–450)
POTASSIUM SERPL-SCNC: 3.8 MMOL/L (ref 3.5–5.1)
PROT SERPL-MCNC: 8.1 G/DL (ref 6.4–8.2)
PROTHROMBIN TIME: 12.6 SECONDS (ref 11.6–14.8)
RBC # BLD AUTO: 5.28 X10(6)UL
SODIUM SERPL-SCNC: 137 MMOL/L (ref 136–145)
WBC # BLD AUTO: 7.5 X10(3) UL (ref 4–11)

## 2024-06-24 PROCEDURE — 80053 COMPREHEN METABOLIC PANEL: CPT

## 2024-06-24 PROCEDURE — 85610 PROTHROMBIN TIME: CPT

## 2024-06-24 PROCEDURE — 36415 COLL VENOUS BLD VENIPUNCTURE: CPT

## 2024-06-24 PROCEDURE — 85025 COMPLETE CBC W/AUTO DIFF WBC: CPT

## 2024-06-24 PROCEDURE — 87529 HSV DNA AMP PROBE: CPT

## 2024-06-26 ENCOUNTER — OFFICE VISIT (OUTPATIENT)
Dept: INTERNAL MEDICINE CLINIC | Facility: CLINIC | Age: 24
End: 2024-06-26

## 2024-06-26 ENCOUNTER — LAB ENCOUNTER (OUTPATIENT)
Dept: LAB | Age: 24
End: 2024-06-26
Attending: INTERNAL MEDICINE

## 2024-06-26 VITALS
HEART RATE: 80 BPM | TEMPERATURE: 98 F | RESPIRATION RATE: 20 BRPM | SYSTOLIC BLOOD PRESSURE: 120 MMHG | BODY MASS INDEX: 37.47 KG/M2 | DIASTOLIC BLOOD PRESSURE: 70 MMHG | HEIGHT: 69 IN | WEIGHT: 253 LBS

## 2024-06-26 DIAGNOSIS — R76.8 POSITIVE ANA (ANTINUCLEAR ANTIBODY): ICD-10-CM

## 2024-06-26 DIAGNOSIS — L29.9 PRURITUS: ICD-10-CM

## 2024-06-26 DIAGNOSIS — A08.11 INFECTION DUE TO NOROVIRUS SPECIES: ICD-10-CM

## 2024-06-26 DIAGNOSIS — F50.9 EATING DISORDER, UNSPECIFIED TYPE: ICD-10-CM

## 2024-06-26 DIAGNOSIS — B17.9 ACUTE HEPATITIS: ICD-10-CM

## 2024-06-26 DIAGNOSIS — R79.89 ABNORMAL LFTS: ICD-10-CM

## 2024-06-26 LAB
ALBUMIN SERPL-MCNC: 3.6 G/DL (ref 3.4–5)
ALP LIVER SERPL-CCNC: 400 U/L
ALT SERPL-CCNC: 265 U/L
AST SERPL-CCNC: 99 U/L (ref 15–37)
BILIRUB DIRECT SERPL-MCNC: 7.9 MG/DL (ref 0–0.2)
BILIRUB SERPL-MCNC: 9.9 MG/DL (ref 0.1–2)
INR BLD: 0.93 (ref 0.8–1.2)
PROT SERPL-MCNC: 8.2 G/DL (ref 6.4–8.2)
PROTHROMBIN TIME: 12.5 SECONDS (ref 11.6–14.8)

## 2024-06-26 PROCEDURE — 3074F SYST BP LT 130 MM HG: CPT | Performed by: INTERNAL MEDICINE

## 2024-06-26 PROCEDURE — 80076 HEPATIC FUNCTION PANEL: CPT

## 2024-06-26 PROCEDURE — 3008F BODY MASS INDEX DOCD: CPT | Performed by: INTERNAL MEDICINE

## 2024-06-26 PROCEDURE — 36415 COLL VENOUS BLD VENIPUNCTURE: CPT

## 2024-06-26 PROCEDURE — 85610 PROTHROMBIN TIME: CPT

## 2024-06-26 PROCEDURE — 99495 TRANSJ CARE MGMT MOD F2F 14D: CPT | Performed by: INTERNAL MEDICINE

## 2024-06-26 PROCEDURE — 3078F DIAST BP <80 MM HG: CPT | Performed by: INTERNAL MEDICINE

## 2024-06-26 NOTE — PROGRESS NOTES
[facility]    CHIEF COMPLAINT:    Chief Complaint   Patient presents with    ER F/U       Date of Admission: 6/16/2024  Date of Discharge:  6/18/2024  admitted with jaundice, acute norovirus infection   Symptoms has been better since discharged states urine looks little dark and mild outbreak of hives           HISTORY OF PRESENT ILLNESS:  here for follow up hospital stay.   Admitted for jaundice, fever, diarrhea, malaise.   He had acute hepatitis with increased liver enzymes and bilirubin.   He tested positive for norovirus. Also had taken an otc supplement called bloom. Autoimmune hepatitis was also considered.   He was seen by gi.  He was treated conservatively and improved. He was discharged to follow up closely with gi as outpatient.   Per discharge summary:   History of Present Illness: Mauricio Joyner is a 23 year old male with no significant pmhx who presents with complaint of several days of nausea, NBNB emesis, and multiple episodes of loose stools. He reports he developed malaise, fatigue, HA's and felt feverish (did not check his temperatures) 5 days ago. He had decreased appetite and poor PO intake the following days as well as continued to feel feverish/HA's. Had episode of loose/watery stools and nausea a few days ago which has since resolved. This morning, he felt he looked jaundiced when he looked in the mirror and he noted that his urine was much darker than normal and his stools were hank colored. He came to ED for further evaluation. His sister has history of autoimmune hepatitis which was diagnosed after she began using herbal supplements. He has another sister who has history of lupus. He started taking a supplement called \"bloom\" last week, but did not use it this week. No other herbal supplements. No recent sick contacts that he is aware of, or consumption of raw/undercooked meat. He rarely consumes EtOH.      Brief Synopsis: admitted with jaundice, transaminitis. GI was  consulted. His MRCP was negative for biliary obstruction. He tested positive for norovirus infection. He was treated supportively. Acute hepatitis panel was negative. His autoimmune hepatitis panel was pending at time of discharge. He was discharged to home with plan to repeat LFT's in 48 hours and follow up closely with GI in outpt setting. He was also encouraged to f/u with PCP after discharge.     Since being home he is feeling okay.   No abdominal pain or nausea.   Still with jaundice.   His bilirubin and alk phos continued to increase even after discharge and he had close follow up with gi. They did a liver biopsy which showed some inflammation and cholestasis and some steatosis.  The liver injury is thought to be sec to norovirus or possibly drug induced from the otc supplement.   He has orders to repeat liver enzymes today.     He Complains of pruritus. When he scratches the area gets red and he has some dermatographism. No hives. No tongue or lip swelling.   He has allergies to dogs and is currently living with his parents who have dogs.   He also gets itching when he is stressed which he has been lately.     He Complains of struggling with an eating disorder. Has been struggling with it for a few years since the pandemic. He was vegan at that time. Did have a period when he was self inducing vomiting but not doing that any more. He does however still struggle with negative emotions when he eats and has some body dysphoria.   He does not want to see a psychiatrist for meds. Wants to talk to a therapist.     Labs from hospital reviewed.   Positive JOSE ANTONIO.  Negative smooth muscle antibody and negative mitochondrial antibody.      REVIEW OF SYSTEMS:  See HPI    Current Medications:    Current Outpatient Medications   Medication Sig Dispense Refill    Multiple Vitamin (ONE-DAILY MULTI VITAMINS) Oral Tab Take 1 tablet by mouth daily.      Cyanocobalamin (VITAMIN B-12 OR) Take 1 tablet by mouth daily.      Ascorbic  Acid (VITAMIN C OR) Take 1 tablet by mouth daily.         PAST MEDICAL, SOCIAL, AND FAMILY HISTORY:  Tobacco:    History   Smoking Status    Former    Types: Cigarettes   Smokeless Tobacco    Never       PHYSICAL EXAM:  /70 (BP Location: Right arm, Patient Position: Sitting, Cuff Size: adult)   Pulse 80   Temp 97.8 °F (36.6 °C)   Resp 20   Ht 5' 9\" (1.753 m)   Wt 253 lb (114.8 kg)   BMI 37.36 kg/m²    GENERAL: well developed, well nourished,in no apparent distress  SKIN: jaundice present  EYES:has scleral icterus  LUNGS: clear to auscultation  CARDIO: RRR without murmur  GI: normal bowel sounds, abdomen is soft, no tenderness, no hsm, no rebound or guarding.   EXTREMITIES:  no edema, muscle strength normal.     DATA:  Results for orders placed or performed in visit on 06/24/24   Comp Metabolic Panel (14)   Result Value Ref Range    Glucose 96 70 - 99 mg/dL    Sodium 137 136 - 145 mmol/L    Potassium 3.8 3.5 - 5.1 mmol/L    Chloride 108 98 - 112 mmol/L    CO2 22.0 21.0 - 32.0 mmol/L    Anion Gap 7 0 - 18 mmol/L    BUN 11 9 - 23 mg/dL    Creatinine 1.02 0.70 - 1.30 mg/dL    Calcium, Total 9.2 8.5 - 10.1 mg/dL    Calculated Osmolality 283 275 - 295 mOsm/kg    eGFR-Cr 105 >=60 mL/min/1.73m2    AST 95 (H) 15 - 37 U/L     (H) 16 - 61 U/L    Alkaline Phosphatase 399 (H) 45 - 117 U/L    Bilirubin, Total 9.2 (H) 0.1 - 2.0 mg/dL    Total Protein 8.1 6.4 - 8.2 g/dL    Albumin 3.6 3.4 - 5.0 g/dL    Globulin  4.5 (H) 2.8 - 4.4 g/dL    A/G Ratio 0.8 (L) 1.0 - 2.0    Patient Fasting for CMP? No    Prothrombin Time (PT)   Result Value Ref Range    PT 12.6 11.6 - 14.8 seconds    INR 0.94 0.80 - 1.20   CBC W/ DIFFERENTIAL   Result Value Ref Range    WBC 7.5 4.0 - 11.0 x10(3) uL    RBC 5.28 4.30 - 5.70 x10(6)uL    HGB 14.9 13.0 - 17.5 g/dL    HCT 44.1 39.0 - 53.0 %    .0 150.0 - 450.0 10(3)uL    MCV 83.5 80.0 - 100.0 fL    MCH 28.2 26.0 - 34.0 pg    MCHC 33.8 31.0 - 37.0 g/dL    RDW 16.6 %    Neutrophil  Absolute Prelim 4.14 1.50 - 7.70 x10 (3) uL    Neutrophil Absolute 4.14 1.50 - 7.70 x10(3) uL    Lymphocyte Absolute 2.12 1.00 - 4.00 x10(3) uL    Monocyte Absolute 0.97 0.10 - 1.00 x10(3) uL    Eosinophil Absolute 0.12 0.00 - 0.70 x10(3) uL    Basophil Absolute 0.06 0.00 - 0.20 x10(3) uL    Immature Granulocyte Absolute 0.06 0.00 - 1.00 x10(3) uL    Neutrophil % 55.4 %    Lymphocyte % 28.4 %    Monocyte % 13.0 %    Eosinophil % 1.6 %    Basophil % 0.8 %    Immature Granulocyte % 0.8 %            ASSESSMENT AND PLAN:  1. Acute hepatitis  Transaminases improving.   Bilirubin still elevated.   follow up with gi, he is getting blood test today.     2. Infection due to Norovirus species  Diarrhea has resolved. Still has soft stools.   Monitor.     3. Pruritus  Likely multifactorial from the hyperbilirubinemia and may have a component of allergies as well.   If cleared by gi he can try benadryl otc but he is reluctant to do so.   Try ice packs.   Should improve as the hyperbilirubinemia improves.    4. Eating disorder, unspecified type  - Maynor Kingsleyator    5. Positive JOSE ANTONIO (antinuclear antibody)  Other testing for autoimmune hepatitis is negative.   follow up with gi.   Will also refer him to rheum.   - Rheumatology Referral - [facility])        Return in about 1 month (around 7/26/2024) for physical. And follow up.       Raiza Browne MD

## 2024-06-27 LAB
HSV-1 DNA: NEGATIVE
HSV-2 DNA: NEGATIVE
VALUE: 0.01
VALUE: 0.01

## 2024-06-28 ENCOUNTER — TELEPHONE (OUTPATIENT)
Age: 24
End: 2024-06-28

## 2024-06-28 ENCOUNTER — LAB ENCOUNTER (OUTPATIENT)
Dept: LAB | Age: 24
End: 2024-06-28
Attending: INTERNAL MEDICINE
Payer: COMMERCIAL

## 2024-06-28 DIAGNOSIS — R79.89 ABNORMAL LFTS: ICD-10-CM

## 2024-06-28 LAB
ALBUMIN SERPL-MCNC: 3.3 G/DL (ref 3.4–5)
ALP LIVER SERPL-CCNC: 335 U/L
ALT SERPL-CCNC: 265 U/L
AST SERPL-CCNC: 113 U/L (ref 15–37)
BILIRUB DIRECT SERPL-MCNC: 7.8 MG/DL (ref 0–0.2)
BILIRUB SERPL-MCNC: 10 MG/DL (ref 0.1–2)
INR BLD: 0.91 (ref 0.8–1.2)
PROT SERPL-MCNC: 7.8 G/DL (ref 6.4–8.2)
PROTHROMBIN TIME: 12.3 SECONDS (ref 11.6–14.8)

## 2024-06-28 PROCEDURE — 36415 COLL VENOUS BLD VENIPUNCTURE: CPT

## 2024-06-28 PROCEDURE — 80076 HEPATIC FUNCTION PANEL: CPT

## 2024-06-28 PROCEDURE — 85610 PROTHROMBIN TIME: CPT

## 2024-06-28 NOTE — TELEPHONE ENCOUNTER
[facility]  [address]  [address]  [facility] [address]  Phone: [phone]  [email or web address]     Sharon Bush  [facility]  [facility] [facility] [address]  Phone: [phone]  [email or web address]     Rachna Jennifer  [facility]  [address] [place] [address]  [address]  [facility] [address]  Phone: [phone]  [email or web address]     Candy Manzanares  [facility]  [address] [facility] [address]  Phone: [phone]  [email or web address]

## 2024-07-01 ENCOUNTER — LAB ENCOUNTER (OUTPATIENT)
Dept: LAB | Age: 24
End: 2024-07-01
Attending: INTERNAL MEDICINE
Payer: COMMERCIAL

## 2024-07-01 DIAGNOSIS — R79.89 ABNORMAL LFTS: ICD-10-CM

## 2024-07-01 LAB
ALBUMIN SERPL-MCNC: 3.6 G/DL (ref 3.4–5)
ALP LIVER SERPL-CCNC: 330 U/L
ALT SERPL-CCNC: 278 U/L
AST SERPL-CCNC: 105 U/L (ref 15–37)
BILIRUB DIRECT SERPL-MCNC: 7 MG/DL (ref 0–0.2)
BILIRUB SERPL-MCNC: 9.3 MG/DL (ref 0.1–2)
INR BLD: 0.96 (ref 0.8–1.2)
PROT SERPL-MCNC: 8.6 G/DL (ref 6.4–8.2)
PROTHROMBIN TIME: 12.8 SECONDS (ref 11.6–14.8)

## 2024-07-01 PROCEDURE — 36415 COLL VENOUS BLD VENIPUNCTURE: CPT

## 2024-07-01 PROCEDURE — 80076 HEPATIC FUNCTION PANEL: CPT

## 2024-07-01 PROCEDURE — 85610 PROTHROMBIN TIME: CPT

## 2024-07-08 ENCOUNTER — LAB ENCOUNTER (OUTPATIENT)
Dept: LAB | Age: 24
End: 2024-07-08
Attending: PHYSICAL MEDICINE & REHABILITATION
Payer: COMMERCIAL

## 2024-07-08 DIAGNOSIS — R79.89 ABNORMAL LFTS: ICD-10-CM

## 2024-07-08 DIAGNOSIS — B17.9 ACUTE HEPATITIS: ICD-10-CM

## 2024-07-08 DIAGNOSIS — R17 JAUNDICE: ICD-10-CM

## 2024-07-08 LAB
ALBUMIN SERPL-MCNC: 3.5 G/DL (ref 3.4–5)
ALP LIVER SERPL-CCNC: 308 U/L
ALT SERPL-CCNC: 317 U/L
AST SERPL-CCNC: 122 U/L (ref 15–37)
BILIRUB DIRECT SERPL-MCNC: 6.3 MG/DL (ref 0–0.2)
BILIRUB SERPL-MCNC: 8 MG/DL (ref 0.1–2)
INR BLD: 0.94 (ref 0.8–1.2)
PROT SERPL-MCNC: 8.3 G/DL (ref 6.4–8.2)
PROTHROMBIN TIME: 12.6 SECONDS (ref 11.6–14.8)

## 2024-07-08 PROCEDURE — 36415 COLL VENOUS BLD VENIPUNCTURE: CPT

## 2024-07-08 PROCEDURE — 85610 PROTHROMBIN TIME: CPT

## 2024-07-08 PROCEDURE — 80076 HEPATIC FUNCTION PANEL: CPT

## 2024-07-15 ENCOUNTER — LAB ENCOUNTER (OUTPATIENT)
Dept: LAB | Age: 24
End: 2024-07-15
Attending: INTERNAL MEDICINE
Payer: COMMERCIAL

## 2024-07-15 DIAGNOSIS — R79.89 ABNORMAL LFTS: ICD-10-CM

## 2024-07-15 LAB
ALBUMIN SERPL-MCNC: 3.6 G/DL (ref 3.4–5)
ALP LIVER SERPL-CCNC: 238 U/L
ALT SERPL-CCNC: 214 U/L
AST SERPL-CCNC: 75 U/L (ref 15–37)
BILIRUB DIRECT SERPL-MCNC: 4.6 MG/DL (ref 0–0.2)
BILIRUB SERPL-MCNC: 5.8 MG/DL (ref 0.1–2)
PROT SERPL-MCNC: 8.5 G/DL (ref 6.4–8.2)

## 2024-07-15 PROCEDURE — 80076 HEPATIC FUNCTION PANEL: CPT

## 2024-07-15 PROCEDURE — 36415 COLL VENOUS BLD VENIPUNCTURE: CPT

## 2024-07-24 NOTE — PROGRESS NOTES
[facility]    CHIEF COMPLAINT:   Chief Complaint   Patient presents with    Physical     Here for physical           HPI:   Mauricio Joyner is a 23 year old male who presents for a complete physical exam.      Up to date tdap, hpv vaccines.     Exercise: walking    Derm: he has a keloid on his left ear lobe that has been bothering him a lot. Want to know if it can be taken off.     Seeing gi for follow up of his acute liver injury, likely drug induced. He will be doing liver enzymes today.     Eating disorder: was given referral to  navigator. Has not made an appt yet. No note from  navigator re outreach. Referral redone.     Positive JOSE ANTONIO: was given rheum referral. Has not made an appt yet.     Wt Readings from Last 6 Encounters:   07/25/24 250 lb (113.4 kg)   07/03/24 248 lb 9.6 oz (112.8 kg)   06/26/24 253 lb (114.8 kg)   06/16/24 258 lb (117 kg)   06/15/24 257 lb 15 oz (117 kg)   01/18/23 260 lb (117.9 kg)     Body mass index is 36.92 kg/m².       Current Outpatient Medications   Medication Sig Dispense Refill    Multiple Vitamin (ONE-DAILY MULTI VITAMINS) Oral Tab Take 1 tablet by mouth daily.      Ascorbic Acid (VITAMIN C OR) Take 1 tablet by mouth daily.        Past Medical History:    History of eating disorder    Jaundice    Vomiting      Past Surgical History:   Procedure Laterality Date    Needle biopsy liver        Family History   Problem Relation Age of Onset    Diabetes Mother     High Blood Pressure Mother     High Blood Pressure Father     Heart Attack Father     Other (lupus) Sister       Social History:   Social History     Socioeconomic History    Marital status: Single   Occupational History    Occupation: [employer]    Tobacco Use    Smoking status: Former     Types: Cigarettes    Smokeless tobacco: Never    Tobacco comments:     occasional use for about a year.    Vaping Use    Vaping status: Never Used   Substance and Sexual Activity    Alcohol use:  Not Currently     Comment: rarely    Drug use: Yes     Types: Cannabis     Comment: 2x per year gummies    Sexual activity: Yes     Partners: Male   Other Topics Concern    Caffeine Concern No    Exercise Yes     Comment: running, home workouts    Seat Belt Yes    Special Diet Yes     Comment: Veggie    Stress Concern No    Weight Concern No     Social Determinants of Health     Financial Resource Strain: Low Risk  (6/19/2024)    Financial Resource Strain     Difficulty of Paying Living Expenses: Not very hard     Med Affordability: No   Food Insecurity: No Food Insecurity (6/16/2024)    Food Insecurity     Food Insecurity: Never true   Transportation Needs: No Transportation Needs (6/16/2024)    Transportation Needs     Lack of Transportation: No   Housing Stability: Low Risk  (6/16/2024)    Housing Stability     Housing Instability: No     : single. Children: no.   Exercise: walking.  Diet: watches minimally     REVIEW OF SYSTEMS:   GENERAL: feels well otherwise  SKIN: see hpi  EYES:denies blurred vision or double vision  HEENT: denies nasal congestion, sinus pain or ST  LUNGS: denies shortness of breath with exertion  CARDIOVASCULAR: denies chest pain on exertion  GI: denies abdominal pain,denies heartburn  : denies dysuria  MUSCULOSKELETAL: denies back pain  NEURO: denies headaches  PSYCHE: denies depression or anxiety  HEMATOLOGIC: denies hx of anemia  ENDOCRINE: denies thyroid history  ALL/ASTHMA: denies hx of allergy or asthma  EXAM:   /80   Pulse 90   Temp 98.6 °F (37 °C) (Temporal)   Resp 18   Ht 5' 9\" (1.753 m)   Wt 250 lb (113.4 kg)   SpO2 100%   BMI 36.92 kg/m²   Body mass index is 36.92 kg/m².   GENERAL: well developed, well nourished,in no apparent distress  SKIN: no rashes,no suspicious lesions, has a pea sized keloid on his left lower earlobe. No signs of infection.   HEENT: atraumatic, normocephalic,ears and throat are clear  EYES:PERRLA, conjunctiva are clear  NECK: supple,no  adenopathy,no bruits  CHEST: no chest tenderness  LUNGS: clear to auscultation  CARDIO: nl s1 and s2, RRR without murmur  GI: good BS's,no masses, HSM or tenderness  GENITAL/URINARY:  deferred. No symptoms.   MUSCULOSKELETAL: back is not tender,FROM of the back  EXTREMITIES: no cyanosis, clubbing or edema  NEURO: Oriented times three,cranial nerves are intact,motor and sensory are grossly intact  Labs:   Lab Results   Component Value Date/Time    WBC 7.5 06/24/2024 11:25 AM    HGB 14.9 06/24/2024 11:25 AM    .0 06/24/2024 11:25 AM      Lab Results   Component Value Date/Time    GLU 96 06/24/2024 11:25 AM     06/24/2024 11:25 AM    K 3.8 06/24/2024 11:25 AM     06/24/2024 11:25 AM    CO2 22.0 06/24/2024 11:25 AM    CREATSERUM 1.02 06/24/2024 11:25 AM    CA 9.2 06/24/2024 11:25 AM    ALB 3.6 07/15/2024 11:11 AM    TP 8.5 (H) 07/15/2024 11:11 AM    ALKPHO 238 (H) 07/15/2024 11:11 AM    AST 75 (H) 07/15/2024 11:11 AM     (H) 07/15/2024 11:11 AM    BILT 5.8 (H) 07/15/2024 11:11 AM    TSH 1.660 01/12/2022 10:09 AM        Lab Results   Component Value Date/Time    CHOLEST 126 01/12/2022 10:09 AM    HDL 42 01/12/2022 10:09 AM    TRIG 37 01/12/2022 10:09 AM    LDL 75 01/12/2022 10:09 AM    NONHDLC 84 01/12/2022 10:09 AM       Lab Results   Component Value Date/Time    A1C 6.2 (H) 06/16/2024 09:39 AM      Vitamin D:    No results found for: \"VITD\"        ASSESSMENT AND PLAN:   Mauricio Joyner is a 23 year old male who presents for a complete physical exam.   1. Physical exam, annual  Do labs.   Other labs reviewed.   Pre diabetic A1c discussed.   Urged regular exercise.   Immunizations discussed.   - TSH W Reflex To Free T4 [E]; Future  - Lipid Panel [E]; Future    2. Elevated antinuclear antibody (JOSE ANTONIO) level  Will recheck.   - Connective Tissue Disease (JOSE ANTONIO) Screen [E]; Future    3. Keloid scar of skin  - Derm Referral - In Network    4. Eating disorder, unspecified type  - Maynor Arora  Navigator        Return in about 1 year (around 7/25/2025) for physical.      Raiza Browne MD

## 2024-07-25 ENCOUNTER — OFFICE VISIT (OUTPATIENT)
Dept: INTERNAL MEDICINE CLINIC | Facility: CLINIC | Age: 24
End: 2024-07-25
Payer: COMMERCIAL

## 2024-07-25 ENCOUNTER — LAB ENCOUNTER (OUTPATIENT)
Dept: LAB | Age: 24
End: 2024-07-25
Attending: INTERNAL MEDICINE
Payer: COMMERCIAL

## 2024-07-25 VITALS
WEIGHT: 250 LBS | RESPIRATION RATE: 18 BRPM | HEIGHT: 69 IN | HEART RATE: 90 BPM | OXYGEN SATURATION: 100 % | SYSTOLIC BLOOD PRESSURE: 126 MMHG | BODY MASS INDEX: 37.03 KG/M2 | TEMPERATURE: 99 F | DIASTOLIC BLOOD PRESSURE: 80 MMHG

## 2024-07-25 DIAGNOSIS — R79.89 ABNORMAL LIVER FUNCTION TEST: ICD-10-CM

## 2024-07-25 DIAGNOSIS — R76.8 ELEVATED ANTINUCLEAR ANTIBODY (ANA) LEVEL: ICD-10-CM

## 2024-07-25 DIAGNOSIS — Z00.00 PHYSICAL EXAM, ANNUAL: Primary | ICD-10-CM

## 2024-07-25 DIAGNOSIS — L91.0 KELOID SCAR OF SKIN: ICD-10-CM

## 2024-07-25 DIAGNOSIS — Z00.00 PHYSICAL EXAM, ANNUAL: ICD-10-CM

## 2024-07-25 DIAGNOSIS — R79.89 ABNORMAL LFTS: ICD-10-CM

## 2024-07-25 DIAGNOSIS — F50.9 EATING DISORDER, UNSPECIFIED TYPE: ICD-10-CM

## 2024-07-25 LAB
ALBUMIN SERPL-MCNC: 4.7 G/DL (ref 3.2–4.8)
ALP LIVER SERPL-CCNC: 224 U/L
ALT SERPL-CCNC: 155 U/L
AST SERPL-CCNC: 69 U/L (ref ?–34)
BILIRUB DIRECT SERPL-MCNC: 2.8 MG/DL (ref ?–0.3)
BILIRUB SERPL-MCNC: 3.9 MG/DL (ref 0.3–1.2)
CHOLEST SERPL-MCNC: 226 MG/DL (ref ?–200)
FASTING PATIENT LIPID ANSWER: YES
HDLC SERPL-MCNC: 29 MG/DL (ref 40–59)
LDLC SERPL CALC-MCNC: 181 MG/DL (ref ?–100)
NONHDLC SERPL-MCNC: 197 MG/DL (ref ?–130)
PROT SERPL-MCNC: 8.5 G/DL (ref 5.7–8.2)
TRIGL SERPL-MCNC: 90 MG/DL (ref 30–149)
TSI SER-ACNC: 1.55 MIU/ML (ref 0.55–4.78)
VLDLC SERPL CALC-MCNC: 18 MG/DL (ref 0–30)

## 2024-07-25 PROCEDURE — 86038 ANTINUCLEAR ANTIBODIES: CPT

## 2024-07-25 PROCEDURE — 80076 HEPATIC FUNCTION PANEL: CPT

## 2024-07-25 PROCEDURE — 36415 COLL VENOUS BLD VENIPUNCTURE: CPT

## 2024-07-25 PROCEDURE — 80061 LIPID PANEL: CPT

## 2024-07-25 PROCEDURE — 86225 DNA ANTIBODY NATIVE: CPT

## 2024-07-25 PROCEDURE — 84443 ASSAY THYROID STIM HORMONE: CPT

## 2024-07-26 LAB
DSDNA IGG SERPL IA-ACNC: 1.7 IU/ML
ENA AB SER QL IA: 27 UG/L
ENA AB SER QL IA: POSITIVE

## 2024-07-29 ENCOUNTER — TELEPHONE (OUTPATIENT)
Age: 24
End: 2024-07-29

## 2024-07-29 ENCOUNTER — TELEPHONE (OUTPATIENT)
Dept: INTERNAL MEDICINE CLINIC | Facility: CLINIC | Age: 24
End: 2024-07-29

## 2024-07-29 NOTE — TELEPHONE ENCOUNTER
Per Maynor Arora navigator,    Good morning, Dr. Browne,     The patient has been scheduled for a counseling appointment on 7/31 at 10AM with Dylan Borjas.     Thank you,     BAILEY Hahn, [facility]   Behavioral Health Navigator   [phone]

## 2024-09-06 ENCOUNTER — LAB ENCOUNTER (OUTPATIENT)
Dept: LAB | Age: 24
End: 2024-09-06
Attending: INTERNAL MEDICINE
Payer: COMMERCIAL

## 2024-09-06 DIAGNOSIS — R79.89 ABNORMAL LFTS: ICD-10-CM

## 2024-09-06 LAB
ALBUMIN SERPL-MCNC: 4.6 G/DL (ref 3.2–4.8)
ALP LIVER SERPL-CCNC: 160 U/L
ALT SERPL-CCNC: 145 U/L
AST SERPL-CCNC: 68 U/L (ref ?–34)
BILIRUB DIRECT SERPL-MCNC: 0.5 MG/DL (ref ?–0.3)
BILIRUB SERPL-MCNC: 0.8 MG/DL (ref 0.3–1.2)
PROT SERPL-MCNC: 8 G/DL (ref 5.7–8.2)

## 2024-09-06 PROCEDURE — 80076 HEPATIC FUNCTION PANEL: CPT

## 2024-09-06 PROCEDURE — 36415 COLL VENOUS BLD VENIPUNCTURE: CPT

## 2024-11-20 ENCOUNTER — TELEPHONE (OUTPATIENT)
Age: 24
End: 2024-11-20

## 2024-11-20 ENCOUNTER — TELEPHONE (OUTPATIENT)
Dept: INTERNAL MEDICINE CLINIC | Facility: CLINIC | Age: 24
End: 2024-11-20

## 2024-11-20 NOTE — TELEPHONE ENCOUNTER
I was on HOLD with [facility] on [address].   I hung up, called back to leave a message to relay to them, the patient has never been on this and this was 1st time Rx. Asked that they try and fill this, information wrong.   If they call back, any nurse can take call.

## 2024-11-20 NOTE — TELEPHONE ENCOUNTER
Hello,     The [facility] Navigation team has attempted to reach you regarding an order from Erica Miller's office. We are reaching out in order to assist you in coordinating care and resources that may meet your needs. Please give our office a call at [phone]. For more immediate assistance you can contact our 24-hour help line at [phone]. We look forward to hearing from you soon.

## 2024-11-20 NOTE — TELEPHONE ENCOUNTER
Talked to mary and they said it was first time medication send to [facility] but their system is blocking the refill , letting them it was picked up somewhere else and it will be $218. [facility] can not say where the medication was picked up at.   Talked to pt and pt said it is first time he is on this medication and never took it before and never picked up somewhere else.   Talked to Erica and said the same thing.     Please help with this issue. Thank you

## 2024-11-20 NOTE — TELEPHONE ENCOUNTER
Patient states he went to pharmacy and they stated they didn't have anything for him. Patient looking for script escitalopram 5 MG.

## 2024-11-21 NOTE — TELEPHONE ENCOUNTER
Called the pharmacy again and talked to pharmacist and they said no issue with medication and it will be only $30 and ready for pt. Patient notified. Patient verbalized understanding

## 2024-11-25 ENCOUNTER — HOSPITAL ENCOUNTER (OUTPATIENT)
Age: 24
Discharge: HOME | End: 2024-11-25
Payer: COMMERCIAL

## 2024-11-25 VITALS
OXYGEN SATURATION: 100 % | BODY MASS INDEX: 37 KG/M2 | RESPIRATION RATE: 16 BRPM | DIASTOLIC BLOOD PRESSURE: 98 MMHG | WEIGHT: 250 LBS | SYSTOLIC BLOOD PRESSURE: 130 MMHG | HEART RATE: 72 BPM | TEMPERATURE: 98 F

## 2024-11-25 DIAGNOSIS — H66.92 LEFT OTITIS MEDIA, UNSPECIFIED OTITIS MEDIA TYPE: Primary | ICD-10-CM

## 2024-11-25 PROCEDURE — 99203 OFFICE O/P NEW LOW 30 MIN: CPT | Performed by: PHYSICIAN ASSISTANT

## 2024-11-25 RX ORDER — AMOXICILLIN 875 MG/1
875 TABLET, COATED ORAL 2 TIMES DAILY
Qty: 14 TABLET | Refills: 0 | Status: SHIPPED | OUTPATIENT
Start: 2024-11-25 | End: 2024-12-02

## 2024-11-25 NOTE — ED PROVIDER NOTES
Patient Seen in: [facility]      History     Chief Complaint   Patient presents with    Ear Pain     Stated Complaint: vertigo, ear ache    Subjective:   HPI      24-year-old male presents to the IC for evaluation of left ear pain and muffled sensation for 1 day.  Patient has had URI symptoms for the past week, states the cough and congestions are improving.  No fever.  Unable to hear out of left ear.    Objective:     Past Medical History:    Anxiety    Really bad anxiety attacks    History of eating disorder    Hyperlipidemia    Jaundice    Vomiting              Past Surgical History:   Procedure Laterality Date    Needle biopsy liver                  Social History     Socioeconomic History    Marital status: Single   Occupational History    Occupation: [employer]    Tobacco Use    Smoking status: Former     Types: Cigarettes     Passive exposure: Past    Smokeless tobacco: Never    Tobacco comments:     occasional use for about a year.    Vaping Use    Vaping status: Never Used   Substance and Sexual Activity    Alcohol use: Never     Comment: rarely    Drug use: Yes     Types: Cannabis     Comment: Maybe once or twice a month    Sexual activity: Yes     Partners: Male   Other Topics Concern    Caffeine Concern No    Exercise No    Seat Belt Yes    Special Diet No    Stress Concern Yes    Weight Concern Yes     Comment: Would want to lose weight but struggling with my past ED     Social Drivers of Health     Financial Resource Strain: Low Risk  (6/19/2024)    Financial Resource Strain     Difficulty of Paying Living Expenses: Not very hard     Med Affordability: No   Food Insecurity: No Food Insecurity (6/16/2024)    Food Insecurity     Food Insecurity: Never true   Transportation Needs: No Transportation Needs (6/16/2024)    Transportation Needs     Lack of Transportation: No   Housing Stability: Low Risk  (6/16/2024)    Housing Stability     Housing Instability: No               Review of Systems    Positive for stated complaint: vertigo, ear ache  Other systems are as noted in HPI.  Constitutional and vital signs reviewed.      All other systems reviewed and negative except as noted above.    Physical Exam     ED Triage Vitals [11/25/24 1115]   BP (!) 150/98   Pulse 72   Resp 16   Temp 97.6 °F (36.4 °C)   Temp src Temporal   SpO2 100 %   O2 Device None (Room air)       Current Vitals:   Vital Signs  BP: (!) 130/98 (manual BP)  Pulse: 72  Resp: 16  Temp: 97.6 °F (36.4 °C)  Temp src: Temporal    Oxygen Therapy  SpO2: 100 %  O2 Device: None (Room air)        Physical Exam  Vitals and nursing note reviewed.   Constitutional:       Appearance: He is well-developed.   HENT:      Head: Atraumatic.      Right Ear: Tympanic membrane, ear canal and external ear normal.      Left Ear: Ear canal and external ear normal. Tympanic membrane is erythematous.      Nose: Nose normal.      Mouth/Throat:      Mouth: Mucous membranes are moist.   Eyes:      Conjunctiva/sclera: Conjunctivae normal.   Cardiovascular:      Rate and Rhythm: Normal rate and regular rhythm.   Pulmonary:      Effort: Pulmonary effort is normal.      Breath sounds: Normal breath sounds.   Musculoskeletal:      Cervical back: Normal range of motion and neck supple.   Skin:     General: Skin is warm and dry.      Capillary Refill: Capillary refill takes less than 2 seconds.   Neurological:      Mental Status: He is alert and oriented to person, place, and time.   Psychiatric:         Mood and Affect: Mood normal.             ED Course   Labs Reviewed - No data to display                MDM      24-year-old male presents to the IC for evaluation of left ear pain after URI symptoms for about 1 week.    Differential diagnosis includes but not limited to:  Otitis media, otitis externa, eustachian tube dysfunction    Exam is consistent with otitis media.  Will start on amoxicillin        Medical Decision Making      Disposition  and Plan     Clinical Impression:  1. Left otitis media, unspecified otitis media type         Disposition:  Discharge  11/25/2024 11:44 am    Follow-up:  Raiza Browne MD  [address] [id number] [phone]                Medications Prescribed:  Current Discharge Medication List        START taking these medications    Details   amoxicillin 875 MG Oral Tab Take 1 tablet (875 mg total) by mouth 2 (two) times daily for 7 days.  Qty: 14 tablet, Refills: 0                 Supplementary Documentation:

## 2024-11-25 NOTE — ED INITIAL ASSESSMENT (HPI)
Patient states he has had cold s/s x 1 week now with left ear painful when he blows his nose and hearing is \" muffled\"

## 2024-11-27 ENCOUNTER — TELEPHONE (OUTPATIENT)
Dept: INTERNAL MEDICINE CLINIC | Facility: CLINIC | Age: 24
End: 2024-11-27

## 2024-11-27 ENCOUNTER — TELEPHONE (OUTPATIENT)
Age: 24
End: 2024-11-27

## 2024-11-27 NOTE — TELEPHONE ENCOUNTER
Hello,  Sorry I missed you - I am reaching out from the [facility] department, following up on an order from your provider's office to assist in connecting you with resources for care. If you would like to discuss this further, please give us a call back at [phone], or for more immediate assistance you can contact our 24-hour help line at [phone]. We look forward to hearing from you soon.

## 2024-11-27 NOTE — TELEPHONE ENCOUNTER
Per [facility] navigator,    ----- Message from Kathy SINGH sent at 11/27/2024 10:03 AM CST -----  Regarding: Behavioral Health Navigation Follow Up  Good Morning,    I left messages with my contact information and have not heard back from the patient. In my last message I also provided the [facility]/7 Helpline number just in case [phone]. I am closing the order at this time. Please feel free to re-refer the patient for navigation as needed.       Please let me know if there is anything else I can do.     ATMY Rodriguez  Behavioral Health Navigator   [facility]   24/7 Crisis Line [phone]

## 2025-02-24 ENCOUNTER — PATIENT MESSAGE (OUTPATIENT)
Dept: INTERNAL MEDICINE CLINIC | Facility: CLINIC | Age: 25
End: 2025-02-24

## 2025-02-24 ENCOUNTER — OFFICE VISIT (OUTPATIENT)
Dept: FAMILY MEDICINE CLINIC | Facility: CLINIC | Age: 25
End: 2025-02-24
Payer: COMMERCIAL

## 2025-02-24 VITALS
HEART RATE: 82 BPM | DIASTOLIC BLOOD PRESSURE: 91 MMHG | OXYGEN SATURATION: 98 % | RESPIRATION RATE: 18 BRPM | TEMPERATURE: 99 F | WEIGHT: 264.81 LBS | BODY MASS INDEX: 39.22 KG/M2 | SYSTOLIC BLOOD PRESSURE: 135 MMHG | HEIGHT: 69 IN

## 2025-02-24 DIAGNOSIS — F41.9 ANXIETY AND DEPRESSION: ICD-10-CM

## 2025-02-24 DIAGNOSIS — F32.A ANXIETY AND DEPRESSION: ICD-10-CM

## 2025-02-24 DIAGNOSIS — L08.9 FINGER INFECTION: Primary | ICD-10-CM

## 2025-02-24 PROCEDURE — 3008F BODY MASS INDEX DOCD: CPT | Performed by: NURSE PRACTITIONER

## 2025-02-24 PROCEDURE — 3075F SYST BP GE 130 - 139MM HG: CPT | Performed by: NURSE PRACTITIONER

## 2025-02-24 PROCEDURE — 99213 OFFICE O/P EST LOW 20 MIN: CPT | Performed by: NURSE PRACTITIONER

## 2025-02-24 PROCEDURE — 3080F DIAST BP >= 90 MM HG: CPT | Performed by: NURSE PRACTITIONER

## 2025-02-24 RX ORDER — MUPIROCIN 20 MG/G
1 OINTMENT TOPICAL 3 TIMES DAILY
Qty: 1 EACH | Refills: 0 | Status: SHIPPED | OUTPATIENT
Start: 2025-02-24 | End: 2025-03-03

## 2025-02-24 RX ORDER — CEPHALEXIN 500 MG/1
500 CAPSULE ORAL 2 TIMES DAILY
Qty: 10 CAPSULE | Refills: 0 | Status: SHIPPED | OUTPATIENT
Start: 2025-02-24 | End: 2025-03-01

## 2025-02-24 RX ORDER — ESCITALOPRAM OXALATE 5 MG/1
5 TABLET ORAL DAILY
Qty: 30 TABLET | Refills: 0 | Status: SHIPPED | OUTPATIENT
Start: 2025-02-24

## 2025-02-24 NOTE — PROGRESS NOTES
Subjective:   Patient ID: Mauricio Joyner is a 24 year old male.    Pt poked his finger with a staple about 1 week ago, area continues to be tender to touch and pt noted that the area under the scab appeared to be yellow. Pt is certain that there is no foreign body remaining in the area. Pt has not seen drainage from the site. Denies redness and inflammation, only tenderness to touch. Denies wound streaking. Otherwise pt has been well, no fever, chills, and flu like illnesses.       History/Other:   Review of Systems   Constitutional:  Negative for chills and fever.   Skin:  Positive for wound.        Small tender scab where staple poked skin     Current Outpatient Medications   Medication Sig Dispense Refill    mupirocin 2 % External Ointment Apply 1 Application topically 3 (three) times daily for 7 days. 1 each 0    cephALEXin 500 MG Oral Cap Take 1 capsule (500 mg total) by mouth 2 (two) times daily for 5 days. 10 capsule 0    escitalopram 5 MG Oral Tab Take 1 tablet (5 mg total) by mouth daily. 30 tablet 0    Multiple Vitamin (ONE-DAILY MULTI VITAMINS) Oral Tab Take 1 tablet by mouth daily.      Ascorbic Acid (VITAMIN C OR) Take 1 tablet by mouth daily.       Allergies:Allergies[1]    Objective:   Physical Exam  Nursing note reviewed.   Constitutional:       Appearance: Normal appearance. He is not ill-appearing or toxic-appearing.   HENT:      Head: Atraumatic.      Nose: Nose normal.      Mouth/Throat:      Mouth: Mucous membranes are moist.      Pharynx: Oropharynx is clear.   Eyes:      General: No scleral icterus.     Conjunctiva/sclera: Conjunctivae normal.   Cardiovascular:      Rate and Rhythm: Regular rhythm.      Heart sounds: Normal heart sounds. No murmur heard.  Pulmonary:      Effort: Pulmonary effort is normal.      Breath sounds: Normal breath sounds. No wheezing.   Skin:     General: Skin is warm and dry.      Findings: Wound present.             Comments: Small sub centimeter scab noted on left  index finger with small erythema noted around site. No drainage or wound streaking noted, no significant swelling note. Normal cap refill.    Neurological:      Mental Status: He is oriented to person, place, and time.   Psychiatric:         Mood and Affect: Mood normal.         Behavior: Behavior normal.         Assessment & Plan:   1. Finger infection    2. Anxiety and depression        - Discussed conservative measures such as warm salt soaks for finger three times a day to allow for spontaneous drainage and use topical mupirocin antibiotic ointment 3 times a day. Offered oral antibiotic if area worsens to be taken 2 times a day for 5 days. Plan for follow up if area fails to resolve. If area worsens and pt experiences fever and chills, report to higher level of care for prompt re-evaluation. Pt verbalized understanding and questions answered.     Pt was prescribed an antidepressant by PCP and needs  refill, reviewed that it appears that his doctor wanted to have a follow up to discuss medication response. Encouraged pt to plan for follow up, discussed that a video visit may be an option. Offered to refill meds for 1 month while pt attempts to schedule appt.       Meds This Visit:  Requested Prescriptions     Signed Prescriptions Disp Refills    mupirocin 2 % External Ointment 1 each 0     Sig: Apply 1 Application topically 3 (three) times daily for 7 days.    cephALEXin 500 MG Oral Cap 10 capsule 0     Sig: Take 1 capsule (500 mg total) by mouth 2 (two) times daily for 5 days.    escitalopram 5 MG Oral Tab 30 tablet 0     Sig: Take 1 tablet (5 mg total) by mouth daily.       Imaging & Referrals:  None         [1]   Allergies  Allergen Reactions    Dander HIVES, ITCHING and OTHER (SEE COMMENTS)     Red Eyes      Dust OTHER (SEE COMMENTS)     Nasal Issues - Stuffy Nose, Sneezing

## 2025-02-25 RX ORDER — ESCITALOPRAM OXALATE 5 MG/1
5 TABLET ORAL DAILY
Qty: 90 TABLET | Refills: 1 | Status: SHIPPED | OUTPATIENT
Start: 2025-02-25

## 2025-02-25 NOTE — TELEPHONE ENCOUNTER
Last OV relevant to medication:12/19/24  Last refill date: 2/24/25? #30/refills: 0 from [facility]  When pt was asked to return for OV: as needed/routine care  Upcoming appt/reason: No future appointments.  Was pt informed of any over due labs:       Was sent yesterday by [facility] provider:  Pt was prescribed an antidepressant by PCP and needs  refill, reviewed that it appears that his doctor wanted to have a follow up to discuss medication response. Encouraged pt to plan for follow up, discussed that a video visit may be an option. Offered to refill meds for 1 month while pt attempts to schedule appt.     Do you need to see pt now or when annual physical exam due in July?